# Patient Record
Sex: MALE | Race: WHITE | Employment: FULL TIME | URBAN - METROPOLITAN AREA
[De-identification: names, ages, dates, MRNs, and addresses within clinical notes are randomized per-mention and may not be internally consistent; named-entity substitution may affect disease eponyms.]

---

## 2020-01-24 ENCOUNTER — HOSPITAL ENCOUNTER (OUTPATIENT)
Dept: GENERAL RADIOLOGY | Age: 55
Discharge: HOME | End: 2020-01-24
Attending: INTERNAL MEDICINE
Payer: COMMERCIAL

## 2020-01-24 DIAGNOSIS — M54.30 SCIATICA: ICD-10-CM

## 2020-01-24 PROCEDURE — 72100 X-RAY EXAM L-S SPINE 2/3 VWS: CPT

## 2020-03-09 ENCOUNTER — HOSPITAL ENCOUNTER (OUTPATIENT)
Dept: GENERAL RADIOLOGY | Age: 55
Discharge: HOME | End: 2020-03-09
Attending: PHYSICAL MEDICINE & REHABILITATION
Payer: COMMERCIAL

## 2020-03-09 DIAGNOSIS — M48.061 STENOSIS, SPINAL, LUMBAR: ICD-10-CM

## 2020-03-09 DIAGNOSIS — M54.16 LUMBAR RADICULOPATHY: ICD-10-CM

## 2020-03-09 DIAGNOSIS — M54.17 LUMBOSACRAL NEURITIS: ICD-10-CM

## 2020-03-09 PROCEDURE — 72110 X-RAY EXAM L-2 SPINE 4/>VWS: CPT

## 2021-03-08 ENCOUNTER — HOSPITAL ENCOUNTER (OUTPATIENT)
Age: 56
Setting detail: OUTPATIENT SURGERY
Discharge: HOME | End: 2021-03-08
Attending: SPECIALIST | Admitting: SPECIALIST
Payer: COMMERCIAL

## 2021-03-08 VITALS
OXYGEN SATURATION: 99 % | TEMPERATURE: 97 F | HEIGHT: 72 IN | DIASTOLIC BLOOD PRESSURE: 89 MMHG | SYSTOLIC BLOOD PRESSURE: 147 MMHG | WEIGHT: 311.7 LBS | RESPIRATION RATE: 17 BRPM | HEART RATE: 71 BPM | BODY MASS INDEX: 42.22 KG/M2

## 2021-03-08 DIAGNOSIS — R94.39 ABNORMAL CARDIOVASCULAR STRESS TEST: ICD-10-CM

## 2021-03-08 LAB
ANION GAP SERPL CALC-SCNC: 6 MMOL/L (ref 5–15)
ATRIAL RATE: 74 BPM
BUN SERPL-MCNC: 6 MG/DL (ref 6–20)
BUN/CREAT SERPL: 8 (ref 12–20)
CALCIUM SERPL-MCNC: 8 MG/DL (ref 8.5–10.1)
CALCULATED P AXIS, ECG09: 43 DEGREES
CALCULATED R AXIS, ECG10: 16 DEGREES
CALCULATED T AXIS, ECG11: 41 DEGREES
CHLORIDE SERPL-SCNC: 105 MMOL/L (ref 97–108)
CO2 SERPL-SCNC: 26 MMOL/L (ref 21–32)
CREAT SERPL-MCNC: 0.71 MG/DL (ref 0.7–1.3)
DIAGNOSIS, 93000: NORMAL
ERYTHROCYTE [DISTWIDTH] IN BLOOD BY AUTOMATED COUNT: 12.3 % (ref 11.5–14.5)
GLUCOSE SERPL-MCNC: 175 MG/DL (ref 65–100)
HCT VFR BLD AUTO: 39.7 % (ref 36.6–50.3)
HGB BLD-MCNC: 13.8 G/DL (ref 12.1–17)
MCH RBC QN AUTO: 34 PG (ref 26–34)
MCHC RBC AUTO-ENTMCNC: 34.8 G/DL (ref 30–36.5)
MCV RBC AUTO: 97.8 FL (ref 80–99)
NRBC # BLD: 0 K/UL (ref 0–0.01)
NRBC BLD-RTO: 0 PER 100 WBC
P-R INTERVAL, ECG05: 188 MS
PLATELET # BLD AUTO: 159 K/UL (ref 150–400)
PMV BLD AUTO: 9.6 FL (ref 8.9–12.9)
POTASSIUM SERPL-SCNC: 4 MMOL/L (ref 3.5–5.1)
Q-T INTERVAL, ECG07: 396 MS
QRS DURATION, ECG06: 96 MS
QTC CALCULATION (BEZET), ECG08: 439 MS
RBC # BLD AUTO: 4.06 M/UL (ref 4.1–5.7)
SODIUM SERPL-SCNC: 137 MMOL/L (ref 136–145)
VENTRICULAR RATE, ECG03: 74 BPM
WBC # BLD AUTO: 3.3 K/UL (ref 4.1–11.1)

## 2021-03-08 PROCEDURE — 36415 COLL VENOUS BLD VENIPUNCTURE: CPT

## 2021-03-08 PROCEDURE — 74011250637 HC RX REV CODE- 250/637: Performed by: SPECIALIST

## 2021-03-08 PROCEDURE — 80048 BASIC METABOLIC PNL TOTAL CA: CPT

## 2021-03-08 PROCEDURE — 77030020269 HC MISC IMPL: Performed by: SPECIALIST

## 2021-03-08 PROCEDURE — 93571 IV DOP VEL&/PRESS C FLO 1ST: CPT | Performed by: SPECIALIST

## 2021-03-08 PROCEDURE — C1760 CLOSURE DEV, VASC: HCPCS | Performed by: SPECIALIST

## 2021-03-08 PROCEDURE — 77030004532 HC CATH ANGI DX IMP BSC -A: Performed by: SPECIALIST

## 2021-03-08 PROCEDURE — C1894 INTRO/SHEATH, NON-LASER: HCPCS | Performed by: SPECIALIST

## 2021-03-08 PROCEDURE — 99152 MOD SED SAME PHYS/QHP 5/>YRS: CPT | Performed by: SPECIALIST

## 2021-03-08 PROCEDURE — 74011000250 HC RX REV CODE- 250: Performed by: SPECIALIST

## 2021-03-08 PROCEDURE — 74011250636 HC RX REV CODE- 250/636: Performed by: SPECIALIST

## 2021-03-08 PROCEDURE — 74011000258 HC RX REV CODE- 258: Performed by: SPECIALIST

## 2021-03-08 PROCEDURE — C1887 CATHETER, GUIDING: HCPCS | Performed by: SPECIALIST

## 2021-03-08 PROCEDURE — 93005 ELECTROCARDIOGRAM TRACING: CPT

## 2021-03-08 PROCEDURE — 77030013715 HC INFL SYS MRTM -B: Performed by: SPECIALIST

## 2021-03-08 PROCEDURE — 77030003390 HC NDL ANGI MRTM -A: Performed by: SPECIALIST

## 2021-03-08 PROCEDURE — 85027 COMPLETE CBC AUTOMATED: CPT

## 2021-03-08 PROCEDURE — 93458 L HRT ARTERY/VENTRICLE ANGIO: CPT | Performed by: SPECIALIST

## 2021-03-08 PROCEDURE — 92928 PRQ TCAT PLMT NTRAC ST 1 LES: CPT | Performed by: SPECIALIST

## 2021-03-08 PROCEDURE — 77030008543 HC TBNG MON PRSS MRTM -A: Performed by: SPECIALIST

## 2021-03-08 PROCEDURE — C1725 CATH, TRANSLUMIN NON-LASER: HCPCS | Performed by: SPECIALIST

## 2021-03-08 PROCEDURE — 99153 MOD SED SAME PHYS/QHP EA: CPT | Performed by: SPECIALIST

## 2021-03-08 PROCEDURE — C1874 STENT, COATED/COV W/DEL SYS: HCPCS | Performed by: SPECIALIST

## 2021-03-08 PROCEDURE — 74011000636 HC RX REV CODE- 636: Performed by: SPECIALIST

## 2021-03-08 DEVICE — STENT RONYX25015UX RESOLUTE ONYX 2.50X15
Type: IMPLANTABLE DEVICE | Status: FUNCTIONAL
Brand: RESOLUTE ONYX™

## 2021-03-08 RX ORDER — HEPARIN SODIUM 1000 [USP'U]/ML
INJECTION, SOLUTION INTRAVENOUS; SUBCUTANEOUS AS NEEDED
Status: DISCONTINUED | OUTPATIENT
Start: 2021-03-08 | End: 2021-03-08 | Stop reason: HOSPADM

## 2021-03-08 RX ORDER — CLOPIDOGREL 300 MG/1
TABLET, FILM COATED ORAL AS NEEDED
Status: DISCONTINUED | OUTPATIENT
Start: 2021-03-08 | End: 2021-03-08 | Stop reason: HOSPADM

## 2021-03-08 RX ORDER — SODIUM CHLORIDE 9 MG/ML
75 INJECTION, SOLUTION INTRAVENOUS CONTINUOUS
Status: DISCONTINUED | OUTPATIENT
Start: 2021-03-08 | End: 2021-03-08

## 2021-03-08 RX ORDER — ASPIRIN 325 MG
325 TABLET ORAL ONCE
Status: COMPLETED | OUTPATIENT
Start: 2021-03-08 | End: 2021-03-08

## 2021-03-08 RX ORDER — NITROGLYCERIN 0.4 MG/1
0.4 TABLET SUBLINGUAL
Status: DISCONTINUED | OUTPATIENT
Start: 2021-03-08 | End: 2021-03-08 | Stop reason: HOSPADM

## 2021-03-08 RX ORDER — HYDROCORTISONE SODIUM SUCCINATE 100 MG/2ML
100 INJECTION, POWDER, FOR SOLUTION INTRAMUSCULAR; INTRAVENOUS
Status: DISCONTINUED | OUTPATIENT
Start: 2021-03-08 | End: 2021-03-08 | Stop reason: HOSPADM

## 2021-03-08 RX ORDER — CLOPIDOGREL BISULFATE 75 MG/1
75 TABLET ORAL DAILY
Qty: 30 TAB | Refills: 5 | Status: SHIPPED | OUTPATIENT
Start: 2021-03-08 | End: 2021-12-01

## 2021-03-08 RX ORDER — MIDAZOLAM HYDROCHLORIDE 1 MG/ML
INJECTION, SOLUTION INTRAMUSCULAR; INTRAVENOUS AS NEEDED
Status: DISCONTINUED | OUTPATIENT
Start: 2021-03-08 | End: 2021-03-08 | Stop reason: HOSPADM

## 2021-03-08 RX ORDER — IBUPROFEN 200 MG
200 TABLET ORAL
COMMUNITY
End: 2022-01-13

## 2021-03-08 RX ORDER — SODIUM CHLORIDE 0.9 % (FLUSH) 0.9 %
5-40 SYRINGE (ML) INJECTION EVERY 8 HOURS
Status: DISCONTINUED | OUTPATIENT
Start: 2021-03-08 | End: 2021-03-08 | Stop reason: HOSPADM

## 2021-03-08 RX ORDER — FENTANYL CITRATE 50 UG/ML
INJECTION, SOLUTION INTRAMUSCULAR; INTRAVENOUS AS NEEDED
Status: DISCONTINUED | OUTPATIENT
Start: 2021-03-08 | End: 2021-03-08 | Stop reason: HOSPADM

## 2021-03-08 RX ORDER — METOPROLOL SUCCINATE 100 MG/1
100 TABLET, EXTENDED RELEASE ORAL DAILY
COMMUNITY

## 2021-03-08 RX ORDER — GUAIFENESIN 100 MG/5ML
81 LIQUID (ML) ORAL DAILY
COMMUNITY

## 2021-03-08 RX ORDER — LIDOCAINE HYDROCHLORIDE 10 MG/ML
INJECTION INFILTRATION; PERINEURAL AS NEEDED
Status: DISCONTINUED | OUTPATIENT
Start: 2021-03-08 | End: 2021-03-08 | Stop reason: HOSPADM

## 2021-03-08 RX ORDER — ATORVASTATIN CALCIUM 20 MG/1
20 TABLET, FILM COATED ORAL DAILY
Qty: 30 TAB | Refills: 5 | Status: SHIPPED | OUTPATIENT
Start: 2021-03-08

## 2021-03-08 RX ORDER — OLMESARTAN MEDOXOMIL AND HYDROCHLOROTHIAZIDE 40/25 40; 25 MG/1; MG/1
1 TABLET ORAL DAILY
COMMUNITY
End: 2021-12-01

## 2021-03-08 RX ORDER — DIPHENHYDRAMINE HYDROCHLORIDE 50 MG/ML
25 INJECTION, SOLUTION INTRAMUSCULAR; INTRAVENOUS ONCE
Status: COMPLETED | OUTPATIENT
Start: 2021-03-08 | End: 2021-03-08

## 2021-03-08 RX ORDER — SODIUM CHLORIDE 0.9 % (FLUSH) 0.9 %
5-40 SYRINGE (ML) INJECTION AS NEEDED
Status: DISCONTINUED | OUTPATIENT
Start: 2021-03-08 | End: 2021-03-08 | Stop reason: HOSPADM

## 2021-03-08 RX ORDER — SODIUM CHLORIDE 9 MG/ML
125 INJECTION, SOLUTION INTRAVENOUS CONTINUOUS
Status: DISPENSED | OUTPATIENT
Start: 2021-03-08 | End: 2021-03-08

## 2021-03-08 RX ORDER — HEPARIN SODIUM 200 [USP'U]/100ML
INJECTION, SOLUTION INTRAVENOUS
Status: COMPLETED | OUTPATIENT
Start: 2021-03-08 | End: 2021-03-08

## 2021-03-08 RX ADMIN — SODIUM CHLORIDE 75 ML/HR: 9 INJECTION, SOLUTION INTRAVENOUS at 08:33

## 2021-03-08 RX ADMIN — ASPIRIN 325 MG: 325 TABLET ORAL at 08:33

## 2021-03-08 RX ADMIN — DIPHENHYDRAMINE HYDROCHLORIDE 25 MG: 50 INJECTION, SOLUTION INTRAMUSCULAR; INTRAVENOUS at 08:35

## 2021-03-08 NOTE — PROGRESS NOTES
[address] [facility] March 8, 2021       RE: Elayne Guzmán      To Whom It May Concern,    This is to certify that Elayne Guzmán may return to work on light duty starting 3/10/2021. He is not to lift greater than 10 pounds until after 3/13/2021. Please feel free to contact Dr. Alondra Ling office if you have any questions or concerns. Thank you for your assistance in this matter.       Sincerely,  Abby Sanchez RN

## 2021-03-08 NOTE — DISCHARGE INSTRUCTIONS
Discharge Instructions:                    Cardiac Catheterization/Angiography Discharge Instructions    *Check the puncture site frequently for swelling or bleeding. If you see any bleeding, lie down and apply pressure over the area and call 911. Notify your doctor for any redness, swelling, drainage or oozing from the puncture site. Notify your doctor for any fever or chills. *If the leg or arm with the puncture becomes cold, numb or painful, call Dr Alyse Mora at  [phone]    *Activity should be limited for the next 48 hours. Climb stairs as little as possible and avoid any stooping, bending or strenuous activity for 48 hours. No heavy lifting (anything over 10 pounds) for five days. *Do not drive for 24 hours. *You may resume your usual diet. Drink more fluids than usual.    *Have a responsible person drive you home and stay with you for at least 24 hours after your heart catheterization/angiography. *You may remove the bandage from your groin in 24 hours. You may shower in 24 hours. No tub baths, hot tubs or swimming for one week. Do not place any lotions, creams, powders, ointments over the puncture site for one week. You may place a clean band-aid over the puncture site each day for 5 days. Change this daily. Medications: Take medications as prescribed. Activity:     As tolerated except do not lift over 10 pounds for 5 days. Diet:     American Heart Association. Follow-up:     Follow up with Saima Culp MD on March 22nd, 2021 at [place] [address].  Delmy Schumacher [address]  [phone]      If you smoke, STOP!

## 2021-03-08 NOTE — H&P
Date of Surgery Update:  Miri Lopez was seen and examined. History and physical has been reviewed. The patient has been examined. There have been no significant clinical changes since the completion of the originally dated History and Physical.    Signed By: Micky Gaitan MD     March 8, 2021 8:15 AM       CP, + EBT    Felipe Shelby  1965   Office/Outpatient Visit  Visit Date: Fri, Feb 26, 2021 04:20 pm  Provider: Nadira Arauz MD   Location: [facility])[address] [address] [phone]    Electronically signed by Brittany Hernandez MD on  02/26/2021 04:44:57 PM                           Subjective:    CC: Mr. Jud Oliver is a 63year old male. His primary care physician is Tomasz Diaz MD.  He is here to follow up with the doctor regarding previous testing, stress echocardiogram - 2/23/21. He presents today with a complaint of shortness of breath. The primary reason for today's visit is evaluation of the following: abnormal EKG. He has a history of cardiomyopathy ( unspecified ) and hypertension. HPI:         MD Notes: Sinus, QS V1 V2 Abnormal electrocardiogram [ECG] [EKG] noted. Regarding hypertension:  MD Notes: Family history of CABG father in his 66's, mother in her 52's  Type Primary Hypertension Current symptoms include shortness of breath and lower extremity edema. He denies chest pain. Currently, his treatment regimen consists of Toprol-XL, a diuretic ( hydrochlorothiazide ), and Olmesartan. Regarding dyspnea/shortness of breath: This has been noted for the past more than 5 years. Associated symptoms include peripheral edema. He denies chest tightness. This tends to be worse with exertion. The shortness of breath is better rest.            Regarding cardiomyopathy: Currently, his treatment regimen consists of toprol xl. Stress echo 2/23/21 EF 45%           Coronary Artery Disease:  MD Notes:  Will hold off on statin due to elevated LFT. The patient has had prior EBT calcium score 1690 (extensive coronary calcification). .          MD Notes: Stress echo: EF 45%, apical, inferior, posterior hypokinesis. Abnormal result of other cardiovascular function study noted. Past Medical History / Family History / Social History:     Last Reviewed on 2/25/2021 04:35 PM by Jewel Mcnamara  Past Medical History:     Hypertension Pre-Diabetic   INFLUENZA VACCINE: was last done 2020     Surgical History:   Surgical/Procedural History: Tonsillectomy  Rotator cuff surgery   Joint Replacement:  Shoulder Replacement: Right;   Neck Fusion  Left Wrist     Cardiac Surgery/Procedures:  Neck Surgery  Seed implantation for prostate cancer  Excision of basal cell on the neck     Family History:   Father: Skin Cancer;  cerebrovascular accident   Mother: coronary artery disease; hypertension;  type 2 diabetes     Social History:   Social History:   Occupation: [employer] ;   Marital Status:    Children: 3 children     Tobacco/Alcohol/Supplements:   Last Reviewed on 2/25/2021 04:35 PM by Trang Poe  TOBACCO/ALCOHOL/SUPPLEMENTS   Tobacco: He has a past history of cigarette smoking; quit 2 years. Alcohol: Drinks alcohol on a regular basis. Caffeine:  He admits to consuming caffeine via none. Substance Abuse History:   Last Reviewed on 2/25/2021 04:35 PM by Trang Rahman  Substance Use/Abuse:   None     Mental Health History:   Last Reviewed on 2/25/2021 04:35 PM by Trang Rahman    Communicable Diseases (eg STDs):    Last Reviewed on 2/25/2021 04:35 PM by Jewel Mcnamara    Current Problems:   Last Reviewed on 2/25/2021 04:35 PM by Trang Rahman  Essential (primary) hypertension  Shortness of breath  Abnormal electrocardiogram [ECG] [EKG]  Localized edema  Essential hypertension, benign  Shortness of Breath  Abnormal EKG    Allergies:   Last Reviewed on 2/25/2021 04:35 PM by Trang Rahman  shellfish derived:      Current Medications: Last Reviewed on 2/25/2021 04:35 PM by Trang Rahman  metoprolol succinate 100 mg oral Tablet, Extended Release 24 hr [take 1 tablet (100 mg) by oral route once daily]  olmesartan-hydrochlorothiazide 40-25 mg oral tablet [take 1 tablet by oral route once daily]  ibuprofen 200 mg oral tablet [take 1 tablet (200 mg) by oral route every 4 hours as needed with food]    Objective:    Vitals:     Historical:   2/2/2021  BP:   144/86 mm Hg ( (left arm, , standing, );) 2/2/2021  BP:   131/88 mm Hg ( (left arm, , sitting, );) 2/2/2021  Wt:   326lbs  Exams:   GENERAL:  Alert, oriented to person, place and time. HEENT:  Pinkish palpebral  conjunctivae. Anicteric sclerae. NECK:  No jugular vein engorgement. No bruit. CHEST: Equal expansion. Clear breath sounds. No rales, no wheezing. Heart: Reg rate and rhythm. Grade 2/6 systolic ejection murmur at the left sternal border area. ABDOMEN:  Soft. Normal active bowel sounds. No tenderness. Extremities: 1+ pitting pedal edema. NEURO:  Grossly intact. Assessment:     R94.31   Abnormal electrocardiogram [ECG] [EKG]     I10   Essential (primary) hypertension     R06.02   Shortness of breath     I42.8   Other cardiomyopathies     I25.118   Atherosclerotic heart disease of native coronary artery with other forms of angina pectoris     R94.39   Abnormal result of other cardiovascular function study       ORDERS:     Procedures Ordered:     27112  Education and train for pt self-mgmt by qualified, nonphysician, karina 30 minutes; individual pt  (Send-Out)          XCATH  Cardiac Cath  (In-House)          Other Orders:     BS588T  Queried Patient for Tobacco Use  (Send-Out)              Plan:     Shortness of breath  1. Medication list has been reviewed. Discontinue ibuprofen. Start the following medication(s):  aspirin 81 mg daily with food. .      Smoking Status:  Nonsmoker   2. Advised the patient regarding diet, exercise, and lifestyle modification.     3. The patient to call the office if there is any change in his cardiac symptoms. 4.  Explained to the patient the importance of controlling his cardiac risk factors. Testing/Procedures: Cardiac Catheterization  Explained to the patient the indication, procedure, risks, and benefits of cardiac catheterization. The patient understands  and wishes to proceed with the cath to be performed as an outpatient at [facility] by Dr. Monica Reyes. Schedule a follow up appointment in 2 weeks. The above note was transcribed by Jorge A Escobar and authenticated by Dr. Stephani Carson prior to sign off.

## 2021-03-08 NOTE — CARDIO/PULMONARY
[facility] Cardiopulmonary Rehab: 62 yo male admitted for cardiac cath. S/P PTCA with BONI to LAD. LVEF 55%. Cardiologist is Dr Micah Edge. Met with Martha Swift prior to discharge from post-cath recovery unit. Pt reported he resides with his wife & 17 yo son in [place]. He also has a 28 yo son in [facility], and 26 yo daughter in [facility]. Pt works in [place] as a  at [employer]. PCI education folder with stent card at bedside. Pt indicated awareness that he needed a stent. Reviewed CAD risk factors: HTN, obesity, smoking hx (quit 2019), family hx, age, and gender. Pt started smoking at age 24 and smoked up to 1 ppd. He stopped smoking 2 years ago, and used electronic cigarette to help wean himself off nicotine during 2019. Pt expressed awareness that he needs to follow a heart healthy diet and lose weight. Discussed the benefits of outpatient cardiac rehab program. Pt expressed an interest in cardiac rehab; however, his work schedule would make it difficult to schedule. Pt works Mon-Fri 8a-4p in [place]. Explained that there is a cardiac rehab program in [facility], which would likely be more convenient. Pt reported his work is physically demanding and he walks a great deal. He reported that all his toes on both feet are numb. Pt has hx of cervical spine surgery, as well as other orthopedic issues. Encouraged pt to talk with his PCP about toe numbness as this may negatively impact his balance and result in a fall. Also encouraged pt to discuss cardiac rehab with Dr Micah Edge during his appt on 3/22/21. Pt verbalized understanding and all questions were answered.

## 2021-03-08 NOTE — PROGRESS NOTES
TRANSFER - IN REPORT:    Verbal report received from [facility] RN(name) on Gilbert Evangelina  being received from cath lab(unit) for routine progression of care      Report consisted of patients Situation, Background, Assessment and   Recommendations(SBAR). Information from the following report(s) Procedure Summary was reviewed with the receiving nurse. Opportunity for questions and clarification was provided. Assessment completed upon patients arrival to unit and care assumed. 1205: Patient received from cath lab. Patient with angioseal to right groin site. Clean, dry and intact. No hematoma. Pulses palpable. VS stable. Patient with no complaints at this time. 1210: EKG complete    1235: Discharge instructions and prescriptions reviewed with patient wife. Opportunity provided for questions. Wife verbalized understanding. 1400: Patient HOB elevated 30 degrees. Patient with right groin site clean, dry and intact. No hematoma. Pulses palpable. VS stable. Patient with no complaints at this time. Assisted with lunch tray. 1415: Cardiac rehab at the bedside. 1445:  Patient HOB elevated 60 degrees. Patient with right groin site clean, dry and intact. No hematoma. Pulses palpable. VS stable. Patient with no complaints at this time. 1450: Discharge instructions and prescriptions reviewed with patient. Opportunity provided for questions. Patient verbalized understanding. Signed copy of discharge placed in the front of patient's chart. 1539: Patient dangled on the side of the bed. Patient with right groin site clean, dry and intact. No hematoma. Pulses palpable. VS stable. Patient with no complaints at this time. 1543: Patient ambulated to the bathroom. Voided. Patient with steady gait. Patient with right groin site clean, dry and intact. No hematoma. Pulses palpable. VS stable. Patient with no complaints at this time. 1552: IV and tele removed.     1605: Patient escorted via wheelchair to entrance. Patient wife driving. Patient discharged into care of wife.

## 2021-03-08 NOTE — PROCEDURES
Cath:  Obstructive 1VD:     LAD m70 (iFR 0.85, significant). LCx p20; OM2 20     RCA p30, d30  Normal LVF (EF 55%). No AVG/MR  Successful BONI mLAD:     2.5x15 Nahid     Post-dil with 2.75x12 NC Euphora  RFA angioseal.    ASA/plavix, statin.   F/U with Dr. Sarah Stiles 3/22/21 @ 2pm.

## 2021-03-08 NOTE — PROGRESS NOTES
TRANSFER - OUT REPORT:    Verbal report given to [facility] EMERGENCY SERVICE RN(name) on Oscar Ricketts  being transferred to cath lab(unit) for routine progression of care       Report consisted of patients Situation, Background, Assessment and   Recommendations(SBAR). Information from the following report(s) SBAR was reviewed with the receiving nurse. Lines:   Peripheral IV 03/08/21 Right Forearm (Active)   Site Assessment Clean, dry, & intact 03/08/21 0832   Phlebitis Assessment 0 03/08/21 0832   Infiltration Assessment 0 03/08/21 0832   Dressing Status Clean, dry, & intact 03/08/21 0832   Dressing Type Transparent 03/08/21 0832   Hub Color/Line Status Blue;Flushed; Infusing 03/08/21 0350        Opportunity for questions and clarification was provided.       Patient transported with:   Registered Nurse

## 2021-03-08 NOTE — Clinical Note
Lesion located in the Mid LAD. Balloon inflated using single inflation technique. Lesion #1: Pressure = 20 talha; Duration = 30 sec.

## 2021-03-08 NOTE — Clinical Note
Vitals documented in Cath Lab sent to media tab in patient chart prior to moving from Cath to Angio. Vitals from Angio Lab to be sent separately.

## 2021-03-08 NOTE — Clinical Note
Xray not functioning properly even after reboot. Patient to be moved from Cath Lab to Angio Lab. Sheath intact.

## 2021-03-08 NOTE — Clinical Note
Lesion: Located in the Mid LAD. Stent deployed. First inflation pressure = 16 talha; inflation time: 40 sec.

## 2021-12-01 ENCOUNTER — OFFICE VISIT (OUTPATIENT)
Dept: ORTHOPEDIC SURGERY | Age: 56
End: 2021-12-01
Payer: COMMERCIAL

## 2021-12-01 VITALS — BODY MASS INDEX: 38.43 KG/M2 | WEIGHT: 290 LBS | HEIGHT: 73 IN

## 2021-12-01 DIAGNOSIS — M17.12 OSTEOARTHRITIS OF LEFT KNEE, UNSPECIFIED OSTEOARTHRITIS TYPE: Primary | ICD-10-CM

## 2021-12-01 PROCEDURE — 20610 DRAIN/INJ JOINT/BURSA W/O US: CPT | Performed by: ORTHOPAEDIC SURGERY

## 2021-12-01 PROCEDURE — 99214 OFFICE O/P EST MOD 30 MIN: CPT | Performed by: ORTHOPAEDIC SURGERY

## 2021-12-01 RX ORDER — LIDOCAINE HYDROCHLORIDE 10 MG/ML
1 INJECTION INFILTRATION; PERINEURAL ONCE
Status: COMPLETED | OUTPATIENT
Start: 2021-12-01 | End: 2021-12-01

## 2021-12-01 RX ORDER — DULAGLUTIDE 0.75 MG/.5ML
0.75 INJECTION, SOLUTION SUBCUTANEOUS
COMMUNITY
Start: 2021-11-17

## 2021-12-01 RX ORDER — ERYTHROMYCIN 5 MG/G
OINTMENT OPHTHALMIC
COMMUNITY
Start: 2021-11-29 | End: 2022-01-13

## 2021-12-01 RX ORDER — ENALAPRIL MALEATE AND HYDROCHLOROTHIAZIDE 10; 25 MG/1; MG/1
TABLET ORAL
COMMUNITY
End: 2022-01-13

## 2021-12-01 RX ORDER — DICLOFENAC SODIUM 50 MG/1
50 TABLET, DELAYED RELEASE ORAL
COMMUNITY
Start: 2021-11-12 | End: 2022-02-28

## 2021-12-01 RX ORDER — METFORMIN HYDROCHLORIDE 500 MG/1
500 TABLET ORAL 2 TIMES DAILY WITH MEALS
COMMUNITY
Start: 2021-11-14

## 2021-12-01 RX ORDER — CLONIDINE HYDROCHLORIDE 0.1 MG/1
TABLET ORAL
COMMUNITY
Start: 2021-10-04 | End: 2022-01-13

## 2021-12-01 RX ORDER — CYCLOBENZAPRINE HCL 10 MG
10 TABLET ORAL
Qty: 30 TABLET | Refills: 0 | Status: SHIPPED | OUTPATIENT
Start: 2021-12-01 | End: 2022-02-28

## 2021-12-01 RX ORDER — METHYLPREDNISOLONE ACETATE 40 MG/ML
80 INJECTION, SUSPENSION INTRA-ARTICULAR; INTRALESIONAL; INTRAMUSCULAR; SOFT TISSUE ONCE
Status: COMPLETED | OUTPATIENT
Start: 2021-12-01 | End: 2021-12-01

## 2021-12-01 RX ORDER — BUPIVACAINE HYDROCHLORIDE 2.5 MG/ML
1 INJECTION, SOLUTION INFILTRATION; PERINEURAL ONCE
Status: COMPLETED | OUTPATIENT
Start: 2021-12-01 | End: 2021-12-01

## 2021-12-01 RX ADMIN — BUPIVACAINE HYDROCHLORIDE 2.5 MG: 2.5 INJECTION, SOLUTION INFILTRATION; PERINEURAL at 15:33

## 2021-12-01 RX ADMIN — METHYLPREDNISOLONE ACETATE 80 MG: 40 INJECTION, SUSPENSION INTRA-ARTICULAR; INTRALESIONAL; INTRAMUSCULAR; SOFT TISSUE at 15:35

## 2021-12-01 RX ADMIN — LIDOCAINE HYDROCHLORIDE 1 ML: 10 INJECTION INFILTRATION; PERINEURAL at 15:34

## 2021-12-01 NOTE — PROGRESS NOTES
SUBJECTIVE/OBJECTIVE:  Reza Verduzco (: 1965) is a 61 y.o. male, patient,here for evaluation of the left knee. He was initially evaluated in October of this year. He has been trying some anti-inflammatory medications for his knee. He has known arthritic changes from his x-rays. He presents today for an injection. Physical Exam    Upon physical examination, the patient is well developed, well nourished, alert and oriented times three, with normal mood and affect and walks with an antalgic gait. Upon examination of the left knee, the patient is tender to palpation along the medial joint line, and has an effusion. They have crepitus of the patellofemoral joint with range of motion and discomfort with patella grind testing. The patient has discomfort with Tito´s maneuvers, and the knee is stable. They lack full flexion secondary to the effusion, but have full extension. They have 5/5 strength, and are neurovascularly intact distally. There is no erythema, warmth or skin lesions present. On examination of the contralateral extremity, the patient is nontender to palpation and has excellent range of motion, stability and strength. Imaging:    I have reviewed the patient´s previous diagnostic tests in an effort to support the diagnosis and treatment options. ASSESSMENT/PLAN:  Below is the assessment and plan developed based on review of pertinent history, physical exam, labs, studies, and medications. 1. Osteoarthritis of left knee, unspecified osteoarthritis type  -     IL DRAIN/INJECT LARGE JOINT/BURSA  -     REFERRAL TO ORTHOPEDICS      Return for Referral to knee replacement specilaist.      In discussion with the patient, we considered the numerus possible diagnoses that could be contributing to their present symptoms. We also deliberated on the extensive management options that must be considered to treat their current condition.  We reviewed their accessible prior medical records, diagnostic tests, and current health and employment information. We considered how these symptoms were affecting the patient´s activities of daily living as well as employment and fitness activities. The patient had various questions regarding the possible risks, benefits, complications, morbidity and mortality regarding their diagnosis and treatment options. The patients´ comorbidities were considered, and I advocated that they consider maximizing lifestyle modification through nutrition and exercise to aid in addressing their symptoms. Shared decision making yielded an understanding to move forward with conservation treatment preferences. The patient expressed understanding that if conservative management fails to alleviate the present symptoms they will return to office for re-evaluation and consideration of additional diagnostic tests and potential surgical options. In the interim, we have recommned ice, elevation and anti-inflammatory medications along with a physician directed home exercise program. We discussed the risks and common side effects of anti-inflamatory medications and instructed the patient to discontinue the medication and contact us if they experienced any side effects. The patient was encouraged to discuss the possible side effects with their family physician or pharmacist prior to initiating any new medications. I have encouraged the patient to take an active role in their treatment by pursuing a healthy lifestyle with better nutritional choices and regular low impact exercise. We discussed that weight loss can be beneficial to relieving discomfort in the lower extremities. We discussed the possibility of an injection of a steroid mixed with a local anesthetic to relieve pain and decrease inflammation in the left knee.  The risks of a steroid injection which include but are not limited to cartilage damage, death of nearby bone, joint infection, nerve damage, temporary facial flushing, temporary steroid flare of pain and inflammation in the joint, temporary increase in blood sugar, tendon weakening or rupture, thinning of nearby bone (osteoporosis), thinning of skin and soft tissue around the injection site, and whitening or lightening of the skin around the injection site were reviewed at length. We specifically advised that patients with diabetes talk to their primary care to ensure they are safe for a steroid injection due to the transient increase in blood sugar associated with the injection. We discussed the chance of increased bleeding and bruising if the patient is on blood thinners or certain dietary supplements that have a blood-thinning effect. We advised patients that have an active infection, history of allergic reactions to steroids or take medications that may prohibit them from receiving a steroid injection to talk to their primary care physician before receiving and injection. We also talked about limiting the number of injections because these potential side effects increase with larger doses and repeated use. After confirming the proposed area for injection with the patient, the skin was prepped with alcohol to reduce the chances of infection. The skin was anesthetized with topical ethylene chloride spray and the mixture of steroid and local anesthetic was injected slowly into the left knee in a sterile fashion without difficulty. The needle was removed and disposed of in a sterile container. The patient tolerated the injection well and a band-aid was placed on the skin. The patient was counseled on protecting the injection area, avoiding water submersion for 2 days, icing for pain relief and looking for signs and symptoms of infection. We requested that the patient contact us if any symptoms persist greater than 48 hours after the injection.          After a long discussion regarding treatment options, we have elected to refer the patient to one of our knee replacement specialist for more comprehensive care of their arthritis. Allergies   Allergen Reactions    Shellfish Derived Nausea Only       Current Outpatient Medications   Medication Sig    cloNIDine HCL (CATAPRES) 0.1 mg tablet     diclofenac EC (VOLTAREN) 50 mg EC tablet     Trulicity 0.93 DW/1.7 mL sub-q pen     enalapril-hydroCHLOROthiazide (VASERETIC) 10-25 mg tablet enalapril 10 mg-hydrochlorothiazide 25 mg tablet   Take 1 tablet every day by oral route.  metFORMIN (GLUCOPHAGE) 500 mg tablet     erythromycin (ILOTYCIN) ophthalmic ointment     metoprolol succinate (TOPROL-XL) 100 mg tablet Take 100 mg by mouth daily.  ibuprofen (MOTRIN) 200 mg tablet Take 200 mg by mouth every six (6) hours as needed for Pain.  aspirin 81 mg chewable tablet Take 81 mg by mouth daily.  atorvastatin (Lipitor) 20 mg tablet Take 1 Tab by mouth daily. Indications: treatment to slow progression of coronary artery disease     Current Facility-Administered Medications   Medication    bupivacaine HCl (MARCAINE) 0.25 % (2.5 mg/mL) injection 2.5 mg    lidocaine (XYLOCAINE) 10 mg/mL (1 %) injection 1 mL    methylPREDNISolone acetate (DEPO-MEDROL) 40 mg/mL injection 80 mg       Past Medical History:   Diagnosis Date    Cancer ([facility]     Diabetes ([facility]     Hypertension        No past surgical history on file. No family history on file. Social History     Socioeconomic History    Marital status:      Spouse name: Not on file    Number of children: Not on file    Years of education: Not on file    Highest education level: Not on file   Occupational History    Not on file   Tobacco Use    Smoking status: Former Smoker    Smokeless tobacco: Never Used   Vaping Use    Vaping Use: Never used   Substance and Sexual Activity    Alcohol use:  Yes    Drug use: Never    Sexual activity: Not on file   Other Topics Concern    Not on file   Social History Narrative    Not on file     Social Determinants of Health     Financial Resource Strain:     Difficulty of Paying Living Expenses: Not on file   Food Insecurity:     Worried About Running Out of Food in the Last Year: Not on file    Branden of Food in the Last Year: Not on file   Transportation Needs:     Lack of Transportation (Medical): Not on file    Lack of Transportation (Non-Medical): Not on file   Physical Activity:     Days of Exercise per Week: Not on file    Minutes of Exercise per Session: Not on file   Stress:     Feeling of Stress : Not on file   Social Connections:     Frequency of Communication with Friends and Family: Not on file    Frequency of Social Gatherings with Friends and Family: Not on file    Attends Anabaptist Services: Not on file    Active Member of [address] [place] [address] [employer] or Organizations: Not on file    Attends Club or Organization Meetings: Not on file    Marital Status: Not on file   Intimate Partner Violence:     Fear of Current or Ex-Partner: Not on file    Emotionally Abused: Not on file    Physically Abused: Not on file    Sexually Abused: Not on file   Housing Stability:     Unable to Pay for Housing in the Last Year: Not on file    Number of [place] in the Last Year: Not on file    Unstable Housing in the Last Year: Not on file       Review of Systems    No flowsheet data found. Vitals:  Ht 6' 1\" (1.854 m)   Wt 290 lb (131.5 kg)   BMI 38.26 kg/m²    Body mass index is 38.26 kg/m². An electronic signature was used to authenticate this note.   -- Jooã Soni MD

## 2022-01-13 ENCOUNTER — HOSPITAL ENCOUNTER (INPATIENT)
Age: 57
LOS: 3 days | Discharge: HOME | DRG: 177 | End: 2022-01-16
Attending: EMERGENCY MEDICINE | Admitting: INTERNAL MEDICINE
Payer: COMMERCIAL

## 2022-01-13 ENCOUNTER — APPOINTMENT (OUTPATIENT)
Dept: GENERAL RADIOLOGY | Age: 57
DRG: 177 | End: 2022-01-13
Attending: EMERGENCY MEDICINE
Payer: COMMERCIAL

## 2022-01-13 DIAGNOSIS — J12.82 PNEUMONIA DUE TO COVID-19 VIRUS: Primary | ICD-10-CM

## 2022-01-13 DIAGNOSIS — U07.1 PNEUMONIA DUE TO COVID-19 VIRUS: Primary | ICD-10-CM

## 2022-01-13 DIAGNOSIS — J96.01 ACUTE RESPIRATORY FAILURE WITH HYPOXIA (HCC): ICD-10-CM

## 2022-01-13 PROBLEM — E87.1 HYPONATREMIA: Status: ACTIVE | Noted: 2022-01-13

## 2022-01-13 PROBLEM — E11.9 DM (DIABETES MELLITUS) (HCC): Status: ACTIVE | Noted: 2022-01-13

## 2022-01-13 PROBLEM — I10 HTN (HYPERTENSION): Status: ACTIVE | Noted: 2022-01-13

## 2022-01-13 LAB
ALBUMIN SERPL-MCNC: 2.5 G/DL (ref 3.5–5)
ALBUMIN/GLOB SERPL: 0.5 {RATIO} (ref 1.1–2.2)
ALP SERPL-CCNC: 92 U/L (ref 45–117)
ALT SERPL-CCNC: 76 U/L (ref 12–78)
ANION GAP SERPL CALC-SCNC: 5 MMOL/L (ref 5–15)
AST SERPL-CCNC: 102 U/L (ref 15–37)
BASOPHILS # BLD: 0 K/UL (ref 0–0.1)
BASOPHILS NFR BLD: 0 % (ref 0–1)
BILIRUB SERPL-MCNC: 1.6 MG/DL (ref 0.2–1)
BUN SERPL-MCNC: 8 MG/DL (ref 6–20)
BUN/CREAT SERPL: 10 (ref 12–20)
CALCIUM SERPL-MCNC: 8.9 MG/DL (ref 8.5–10.1)
CHLORIDE SERPL-SCNC: 99 MMOL/L (ref 97–108)
CO2 SERPL-SCNC: 27 MMOL/L (ref 21–32)
COMMENT, HOLDF: NORMAL
COVID-19 RAPID TEST, COVR: DETECTED
CREAT SERPL-MCNC: 0.8 MG/DL (ref 0.7–1.3)
CRP SERPL-MCNC: 7.51 MG/DL (ref 0–0.6)
DIFFERENTIAL METHOD BLD: ABNORMAL
EOSINOPHIL # BLD: 0 K/UL (ref 0–0.4)
EOSINOPHIL NFR BLD: 0 % (ref 0–7)
ERYTHROCYTE [DISTWIDTH] IN BLOOD BY AUTOMATED COUNT: 12.5 % (ref 11.5–14.5)
GLOBULIN SER CALC-MCNC: 5 G/DL (ref 2–4)
GLUCOSE BLD STRIP.AUTO-MCNC: 144 MG/DL (ref 65–117)
GLUCOSE SERPL-MCNC: 142 MG/DL (ref 65–100)
HCT VFR BLD AUTO: 38.7 % (ref 36.6–50.3)
HGB BLD-MCNC: 13.5 G/DL (ref 12.1–17)
IMM GRANULOCYTES # BLD AUTO: 0.1 K/UL (ref 0–0.04)
IMM GRANULOCYTES NFR BLD AUTO: 1 % (ref 0–0.5)
LYMPHOCYTES # BLD: 0.5 K/UL (ref 0.8–3.5)
LYMPHOCYTES NFR BLD: 9 % (ref 12–49)
MCH RBC QN AUTO: 34.4 PG (ref 26–34)
MCHC RBC AUTO-ENTMCNC: 34.9 G/DL (ref 30–36.5)
MCV RBC AUTO: 98.7 FL (ref 80–99)
MONOCYTES # BLD: 0.8 K/UL (ref 0–1)
MONOCYTES NFR BLD: 13 % (ref 5–13)
NEUTS SEG # BLD: 4.6 K/UL (ref 1.8–8)
NEUTS SEG NFR BLD: 77 % (ref 32–75)
NRBC # BLD: 0 K/UL (ref 0–0.01)
NRBC BLD-RTO: 0 PER 100 WBC
PLATELET # BLD AUTO: 217 K/UL (ref 150–400)
PMV BLD AUTO: 9.5 FL (ref 8.9–12.9)
POTASSIUM SERPL-SCNC: 4 MMOL/L (ref 3.5–5.1)
PROCALCITONIN SERPL-MCNC: 0.17 NG/ML
PROT SERPL-MCNC: 7.5 G/DL (ref 6.4–8.2)
RBC # BLD AUTO: 3.92 M/UL (ref 4.1–5.7)
RBC MORPH BLD: ABNORMAL
SAMPLES BEING HELD,HOLD: NORMAL
SERVICE CMNT-IMP: ABNORMAL
SODIUM SERPL-SCNC: 131 MMOL/L (ref 136–145)
SOURCE, COVRS: ABNORMAL
TROPONIN-HIGH SENSITIVITY: 7 NG/L (ref 0–76)
WBC # BLD AUTO: 6 K/UL (ref 4.1–11.1)

## 2022-01-13 PROCEDURE — 80053 COMPREHEN METABOLIC PANEL: CPT

## 2022-01-13 PROCEDURE — 65270000029 HC RM PRIVATE

## 2022-01-13 PROCEDURE — 85027 COMPLETE CBC AUTOMATED: CPT

## 2022-01-13 PROCEDURE — 74011000250 HC RX REV CODE- 250: Performed by: INTERNAL MEDICINE

## 2022-01-13 PROCEDURE — XW0DXM6 INTRODUCTION OF BARICITINIB INTO MOUTH AND PHARYNX, EXTERNAL APPROACH, NEW TECHNOLOGY GROUP 6: ICD-10-PCS | Performed by: INTERNAL MEDICINE

## 2022-01-13 PROCEDURE — 85025 COMPLETE CBC W/AUTO DIFF WBC: CPT

## 2022-01-13 PROCEDURE — 74011250636 HC RX REV CODE- 250/636: Performed by: EMERGENCY MEDICINE

## 2022-01-13 PROCEDURE — 74011250636 HC RX REV CODE- 250/636: Performed by: INTERNAL MEDICINE

## 2022-01-13 PROCEDURE — 77010033678 HC OXYGEN DAILY

## 2022-01-13 PROCEDURE — 80048 BASIC METABOLIC PNL TOTAL CA: CPT

## 2022-01-13 PROCEDURE — 94760 N-INVAS EAR/PLS OXIMETRY 1: CPT

## 2022-01-13 PROCEDURE — 84145 PROCALCITONIN (PCT): CPT

## 2022-01-13 PROCEDURE — 82962 GLUCOSE BLOOD TEST: CPT

## 2022-01-13 PROCEDURE — 71045 X-RAY EXAM CHEST 1 VIEW: CPT

## 2022-01-13 PROCEDURE — 86140 C-REACTIVE PROTEIN: CPT

## 2022-01-13 PROCEDURE — 94761 N-INVAS EAR/PLS OXIMETRY MLT: CPT

## 2022-01-13 PROCEDURE — 36415 COLL VENOUS BLD VENIPUNCTURE: CPT

## 2022-01-13 PROCEDURE — 74011250637 HC RX REV CODE- 250/637: Performed by: INTERNAL MEDICINE

## 2022-01-13 PROCEDURE — 99285 EMERGENCY DEPT VISIT HI MDM: CPT

## 2022-01-13 PROCEDURE — 87635 SARS-COV-2 COVID-19 AMP PRB: CPT

## 2022-01-13 PROCEDURE — 93005 ELECTROCARDIOGRAM TRACING: CPT

## 2022-01-13 PROCEDURE — 74011636637 HC RX REV CODE- 636/637: Performed by: INTERNAL MEDICINE

## 2022-01-13 PROCEDURE — 84484 ASSAY OF TROPONIN QUANT: CPT

## 2022-01-13 RX ORDER — CLOPIDOGREL BISULFATE 75 MG/1
75 TABLET ORAL DAILY
COMMUNITY
End: 2022-01-13

## 2022-01-13 RX ORDER — CLOPIDOGREL BISULFATE 75 MG/1
75 TABLET ORAL DAILY
COMMUNITY
End: 2022-02-28

## 2022-01-13 RX ORDER — DEXAMETHASONE 6 MG/1
6 TABLET ORAL DAILY
Status: DISCONTINUED | OUTPATIENT
Start: 2022-01-13 | End: 2022-01-16 | Stop reason: HOSPADM

## 2022-01-13 RX ORDER — SODIUM CHLORIDE 0.9 % (FLUSH) 0.9 %
5-40 SYRINGE (ML) INJECTION EVERY 8 HOURS
Status: DISCONTINUED | OUTPATIENT
Start: 2022-01-13 | End: 2022-01-16 | Stop reason: HOSPADM

## 2022-01-13 RX ORDER — POLYETHYLENE GLYCOL 3350 17 G/17G
17 POWDER, FOR SOLUTION ORAL DAILY PRN
Status: DISCONTINUED | OUTPATIENT
Start: 2022-01-13 | End: 2022-01-16 | Stop reason: HOSPADM

## 2022-01-13 RX ORDER — ONDANSETRON 4 MG/1
4 TABLET, ORALLY DISINTEGRATING ORAL
Status: DISCONTINUED | OUTPATIENT
Start: 2022-01-13 | End: 2022-01-16 | Stop reason: HOSPADM

## 2022-01-13 RX ORDER — ACETAMINOPHEN 325 MG/1
650 TABLET ORAL
Status: DISCONTINUED | OUTPATIENT
Start: 2022-01-13 | End: 2022-01-16 | Stop reason: HOSPADM

## 2022-01-13 RX ORDER — ACETAMINOPHEN 650 MG/1
650 SUPPOSITORY RECTAL
Status: DISCONTINUED | OUTPATIENT
Start: 2022-01-13 | End: 2022-01-14 | Stop reason: SDUPTHER

## 2022-01-13 RX ORDER — BUMETANIDE 1 MG/1
1 TABLET ORAL
COMMUNITY
End: 2022-02-28

## 2022-01-13 RX ORDER — MAGNESIUM SULFATE 100 %
4 CRYSTALS MISCELLANEOUS AS NEEDED
Status: DISCONTINUED | OUTPATIENT
Start: 2022-01-13 | End: 2022-01-16 | Stop reason: HOSPADM

## 2022-01-13 RX ORDER — ONDANSETRON 2 MG/ML
4 INJECTION INTRAMUSCULAR; INTRAVENOUS
Status: DISCONTINUED | OUTPATIENT
Start: 2022-01-13 | End: 2022-01-16 | Stop reason: HOSPADM

## 2022-01-13 RX ORDER — ENOXAPARIN SODIUM 100 MG/ML
40 INJECTION SUBCUTANEOUS 2 TIMES DAILY
Status: DISCONTINUED | OUTPATIENT
Start: 2022-01-13 | End: 2022-01-16 | Stop reason: HOSPADM

## 2022-01-13 RX ORDER — SODIUM CHLORIDE 9 MG/ML
75 INJECTION, SOLUTION INTRAVENOUS CONTINUOUS
Status: DISCONTINUED | OUTPATIENT
Start: 2022-01-13 | End: 2022-01-13

## 2022-01-13 RX ORDER — SODIUM CHLORIDE 0.9 % (FLUSH) 0.9 %
5-40 SYRINGE (ML) INJECTION AS NEEDED
Status: DISCONTINUED | OUTPATIENT
Start: 2022-01-13 | End: 2022-01-16 | Stop reason: HOSPADM

## 2022-01-13 RX ORDER — OLMESARTAN MEDOXOMIL 40 MG/1
40 TABLET ORAL DAILY
COMMUNITY

## 2022-01-13 RX ORDER — CHOLECALCIFEROL (VITAMIN D3) 125 MCG
2000 CAPSULE ORAL DAILY
COMMUNITY

## 2022-01-13 RX ORDER — BISMUTH SUBSALICYLATE 262 MG
1 TABLET,CHEWABLE ORAL DAILY
COMMUNITY

## 2022-01-13 RX ORDER — ASCORBIC ACID 500 MG
500 TABLET ORAL DAILY
COMMUNITY

## 2022-01-13 RX ORDER — DEXAMETHASONE 6 MG/1
6 TABLET ORAL DAILY
Status: DISCONTINUED | OUTPATIENT
Start: 2022-01-13 | End: 2022-01-13

## 2022-01-13 RX ORDER — DEXTROSE 50 % IN WATER (D50W) INTRAVENOUS SYRINGE
12.5-25 AS NEEDED
Status: DISCONTINUED | OUTPATIENT
Start: 2022-01-13 | End: 2022-01-16 | Stop reason: HOSPADM

## 2022-01-13 RX ORDER — SODIUM CHLORIDE 9 MG/ML
75 INJECTION, SOLUTION INTRAVENOUS CONTINUOUS
Status: DISCONTINUED | OUTPATIENT
Start: 2022-01-13 | End: 2022-01-15

## 2022-01-13 RX ORDER — ACETAMINOPHEN 650 MG/1
650 SUPPOSITORY RECTAL
Status: DISCONTINUED | OUTPATIENT
Start: 2022-01-13 | End: 2022-01-16 | Stop reason: HOSPADM

## 2022-01-13 RX ORDER — ACETAMINOPHEN 325 MG/1
650 TABLET ORAL
Status: DISCONTINUED | OUTPATIENT
Start: 2022-01-13 | End: 2022-01-14 | Stop reason: SDUPTHER

## 2022-01-13 RX ORDER — INSULIN LISPRO 100 [IU]/ML
INJECTION, SOLUTION INTRAVENOUS; SUBCUTANEOUS
Status: DISCONTINUED | OUTPATIENT
Start: 2022-01-13 | End: 2022-01-16 | Stop reason: HOSPADM

## 2022-01-13 RX ADMIN — SODIUM CHLORIDE 1000 ML: 9 INJECTION, SOLUTION INTRAVENOUS at 12:08

## 2022-01-13 RX ADMIN — ACETAMINOPHEN 650 MG: 325 TABLET ORAL at 21:35

## 2022-01-13 RX ADMIN — ENOXAPARIN SODIUM 40 MG: 100 INJECTION SUBCUTANEOUS at 18:39

## 2022-01-13 RX ADMIN — BARICITINIB 4 MG: 2 TABLET, FILM COATED ORAL at 18:40

## 2022-01-13 RX ADMIN — SODIUM CHLORIDE 75 ML/HR: 9 INJECTION, SOLUTION INTRAVENOUS at 18:38

## 2022-01-13 RX ADMIN — INSULIN LISPRO 2 UNITS: 100 INJECTION, SOLUTION INTRAVENOUS; SUBCUTANEOUS at 18:39

## 2022-01-13 RX ADMIN — Medication 10 ML: at 18:46

## 2022-01-13 RX ADMIN — DEXAMETHASONE 6 MG: 6 TABLET ORAL at 18:40

## 2022-01-13 RX ADMIN — Medication 10 ML: at 21:30

## 2022-01-13 NOTE — ED PROVIDER NOTES
Mr. Mili Jara is a 59yo male who presents to the ER with complaints of shortness of breath. He states that he began to have symptoms approximately 5 to 6 days ago. He has progressively felt more fatigued and short of breath. [facility]. Just he was leaving his house, his wife received results that she tested positive by PCR for COVID. He has a cough, congestion, and fatigue. He has had some diarrhea. No swelling in his legs. He denies any other complaints. Past Medical History:   Diagnosis Date    [facility] ([facility]     Diabetes ([facility]     Hypertension        No past surgical history on file. No family history on file. Social History     Socioeconomic History    Marital status:      Spouse name: Not on file    Number of children: Not on file    Years of education: Not on file    Highest education level: Not on file   Occupational History    Not on file   Tobacco Use    Smoking status: Former Smoker    Smokeless tobacco: Never Used   Vaping Use    Vaping Use: Never used   Substance and Sexual Activity    Alcohol use: Yes    Drug use: Never    Sexual activity: Not on file   Other Topics Concern    Not on file   Social History Narrative    Not on file     Social Determinants of Health     Financial Resource Strain:     Difficulty of Paying Living Expenses: Not on file   Food Insecurity:     Worried About Running Out of Food in the Last Year: Not on file    Branden of Food in the Last Year: Not on file   Transportation Needs:     Lack of Transportation (Medical): Not on file    Lack of Transportation (Non-Medical):  Not on file   Physical Activity:     Days of Exercise per Week: Not on file    Minutes of Exercise per Session: Not on file   Stress:     Feeling of Stress : Not on file   Social Connections:     Frequency of Communication with Friends and Family: Not on file    Frequency of Social Gatherings with Friends and Family: Not on file    Attends Voodoo Services: Not on file    Active Member of Clubs or Organizations: Not on file    Attends Club or Organization Meetings: Not on file    Marital Status: Not on file   Intimate Partner Violence:     Fear of Current or Ex-Partner: Not on file    Emotionally Abused: Not on file    Physically Abused: Not on file    Sexually Abused: Not on file   Housing Stability:     Unable to Pay for Housing in the Last Year: Not on file    Number of [place] in the Last Year: Not on file    Unstable Housing in the Last Year: Not on file         ALLERGIES: Shellfish derived    Review of Systems   Constitutional: Positive for fatigue. Negative for chills. HENT: Positive for congestion. Respiratory: Positive for cough and shortness of breath. Cardiovascular: Negative for chest pain. Gastrointestinal: Negative for abdominal pain, diarrhea, nausea and vomiting. Genitourinary: Negative for dysuria and hematuria. Musculoskeletal: Negative for arthralgias and myalgias. Skin: Negative for pallor and rash. Neurological: Negative for dizziness, weakness and light-headedness. All other systems reviewed and are negative. Vitals:    01/13/22 1139   BP: (!) 151/77   Pulse: (!) 105   Resp: 30   Temp: 99.7 °F (37.6 °C)   SpO2: 97%   Weight: 141 kg (310 lb 13.6 oz)   Height: 6' 1\" (1.854 m)            Physical Exam     Vital signs reviewed. Nursing notes reviewed.     Const:  No acute distress, well developed, well nourished  Head:  Atraumatic, normocephalic  Eyes:  PERRL, conjunctiva normal, no scleral icterus  Neck:  Supple, trachea midline  Cardiovascular: Regular rate  Resp:   Moderate resp distress, increased work of breathing  Abd:  Soft, non-tender, non-distended  MSK:  No pedal edema, normal ROM  Neuro:  Alert and oriented x3, no cranial nerve defect  Skin:  Warm, dry, intact  Psych: normal mood and affect, behavior is normal, judgement and thought content is normal          MDM  Number of Diagnoses or Management Options     Amount and/or Complexity of Data Reviewed  Clinical lab tests: ordered and reviewed  Tests in the radiology section of CPT®: reviewed and ordered  Review and summarize past medical records: yes    Patient Progress  Patient progress: stable            Total critical care time spent exclusive of procedures:  35 min. Perfect Serve Consult for Admission  1:46 PM    ED Room Number: YY35/61  Patient Name and age:  Pao Garner 61 y.o.  male  Working Diagnosis:   1. Pneumonia due to COVID-19 virus    2. Acute respiratory failure with hypoxia ([address]        COVID-19 Suspicion:  yes  Sepsis present:  no  Reassessment needed: no  Readmission: no  Isolation Requirements:  yes  Recommended Level of Care:  telemetry  Department:[facility] ED - [phone]        Mr. Lawrence Yu is a 57yo male who presents to the ER with complaints of SOB. He was hypoxic in the ER. Pt. Found to have covid pneumonia. Pt.  To be evaluated for admission by the hospitalist.          Procedures

## 2022-01-13 NOTE — H&P
[facility]  [address] [facility], [facility] [address]  [phone]    Admission History and Physical      NAME:  Judy Garcia   :      MRN:  [id number]     PCP:  Elena Pollack MD     Date of service:  2022         Subjective:     CHIEF COMPLAINT: Cough and shortness of breath    HISTORY OF PRESENT ILLNESS:     Mr. Da Wilde is a 61 y.o.  male who is admitted with acute respiratory failure with hypoxia. Mr. Da Wilde with past medical history of DM, HTN, obesity presented to ER complaining of cough and shortness of breath which started about 6 days ago. The cough is productive associated with shortness of breath. Denies chest pain. Had low-grade fever. Patient is unvaccinated. His wife is also tested positive for COVID-19. Past Medical History:   Diagnosis Date    [facility] ([facility] [facility]     Diabetes ([facility] [facility]     Hypertension         No past surgical history on file. Social History     Tobacco Use    Smoking status: Former Smoker    Smokeless tobacco: Never Used   Substance Use Topics    Alcohol use: Yes        No family history on file. Allergies   Allergen Reactions    Shellfish Derived Nausea Only        Prior to Admission medications    Medication Sig Start Date End Date Taking? Authorizing Provider   cloNIDine HCL (CATAPRES) 0.1 mg tablet  10/4/21   Provider, Historical   diclofenac EC (VOLTAREN) 50 mg EC tablet  21   Provider, Historical   Trulicity 6.63 KF/5.5 mL sub-q pen  21   Provider, Historical   enalapril-hydroCHLOROthiazide (VASERETIC) 10-25 mg tablet enalapril 10 mg-hydrochlorothiazide 25 mg tablet   Take 1 tablet every day by oral route. Provider, Historical   metFORMIN (GLUCOPHAGE) 500 mg tablet  21   Provider, Historical   erythromycin (ILOTYCIN) ophthalmic ointment  21   Provider, Historical   cyclobenzaprine (FLEXERIL) 10 mg tablet Take 1 Tablet by mouth nightly as needed for Muscle Spasm(s).  21 Dante Thomas MD   metoprolol succinate (TOPROL-XL) 100 mg tablet Take 100 mg by mouth daily. Provider, Historical   ibuprofen (MOTRIN) 200 mg tablet Take 200 mg by mouth every six (6) hours as needed for Pain. Provider, Historical   aspirin 81 mg chewable tablet Take 81 mg by mouth daily. Provider, Historical   atorvastatin (Lipitor) 20 mg tablet Take 1 Tab by mouth daily.  Indications: treatment to slow progression of coronary artery disease 3/8/21   Alli Hernandez MD         Review of Systems:  (bold if positive, if negative)    Gen:  Eyes:  ENT:  CVS:  Pulm:  Cough, dyspnea,GI:  :  MS:  Skin:  Psych:  Endo:  Hem:  Renal:  Neuro:            Objective:      VITALS:    Vital signs reviewed; most recent are:    Visit Vitals  BP (!) 151/77   Pulse (!) 104   Temp 99.7 °F (37.6 °C)   Resp 30   Ht 6' 1\" (1.854 m)   Wt 141 kg (310 lb 13.6 oz)   SpO2 94%   BMI 41.01 kg/m²     SpO2 Readings from Last 6 Encounters:   01/13/22 94%   03/08/21 99%    O2 Flow Rate (L/min): 2 l/min   No intake or output data in the 24 hours ending 01/13/22 1445         Exam:     Physical Exam:    Gen:  Well-developed, well-nourished, in no acute distress  HEENT:  Pink conjunctivae, PERRL, hearing intact to voice, moist mucous membranes  Neck:  Supple, without masses, thyroid non-tender  Resp:  No accessory muscle use, clear breath sounds without wheezes rales or rhonchi  Card:  No murmurs, normal S1, S2 without thrills, bruits or peripheral edema  Abd:  Soft, non-tender, non-distended, normoactive bowel sounds are present, no palpable organomegaly and no detectable hernias  Lymph:  No cervical or inguinal adenopathy  Musc:  No cyanosis or clubbing  Skin:  No rashes or ulcers, skin turgor is good  Neuro:  Cranial nerves are grossly intact, no focal motor weakness, follows commands appropriately  Psych:  Good insight, oriented to person, place and time, alert       Labs:    Recent Labs     01/13/22  1140   WBC 6.0   HGB 13.5   HCT 38.7        Recent Labs     01/13/22  1140   *   K 4.0   CL 99   CO2 27   *   BUN 8   CREA 0.80   CA 8.9   ALB 2.5*   TBILI 1.6*   ALT 76     No results found for: GLUCPOC  No results for input(s): PH, PCO2, PO2, HCO3, FIO2 in the last 72 hours. No results for input(s): INR, INREXT in the last 72 hours. Telemetry reviewed:           Assessment/Plan:    1. Acute respiratory failure with hypoxia ([facility] (1/13/2022) due to COVID-19 pneumonia. Supplemental oxygen to keep SaO2 greater than 90%    2. Pneumonia due to COVID-19 virus (1/13/2022). Unvaccinated. Start dexamethasone and baricitinib per pharmacy dosing. Check inflammatory markers. 3.  Hyponatremia (1/13/2022). Likely secondary to volume depletion. Hold HCTZ. Start normal saline IV fluids    4. HTN (hypertension) (1/13/2022). Hold HCTZ due to hyponatremia. Continue home medications    5. DM (diabetes mellitus) ([facility] [address] (1/13/2022). Hold metformin. Cover with sliding scale    6. Obesity. Would benefit from weight loss    7. Abnormal LFT. likely due to COVID.  Monitor closely      Previous medical records reviewed     Risk of deterioration: high      Total time spent with patient: 79 [address] [facility] discussed with: Patient, Nursing Staff and >50% of time spent in counseling and coordination of care    Discussed:  Care Plan    Prophylaxis:  Lovenox    Probable Disposition:  Home w/Family           ___________________________________________________    Attending Physician: Kory Jensen MD

## 2022-01-13 NOTE — ED TRIAGE NOTES
Patient presents to ED via EMS for reported shortness of breath and cough since Saturday. [facility] EMS reports room air sats in high 80s so patient placed on 4 liters of oxygen with sats that improved to high 90s en route. Patient denies any pain. Wife reportedly diagnosed with covid pneumonia since this morning. Patient has not been vaccinated.

## 2022-01-13 NOTE — PROGRESS NOTES
[facility] Dosing Services: 1/13/2022    Consult for Enoxaparin by Dr. Cherelle swenson for this 61 y.o. male, for prophylaxis of  DVT. Wt Readings from Last 1 Encounters:   01/13/22 141 kg (310 lb 13.6 oz)       Ht Readings from Last 1 Encounters:   01/13/22 185.4 cm (73\")       Lovenox dose adjusted to 40 mg Q12 hrs for BMI >30 kg/m2 per COVID-19 DVT prophylaxis protocol. Baseline D Dimer not available for review. No documentation active clot per imaging or notes. Previous Dose Lovenox 30 mg Q12 hrs ppx   Creatinine Clearance Estimated Creatinine Clearance: 150.3 mL/min (based on SCr of 0.8 mg/dL). Creatinine Lab Results   Component Value Date/Time    Creatinine 0.80 01/13/2022 11:40 AM       Platelet Lab Results   Component Value Date/Time    PLATELET 737 88/47/6905 11:40 AM      H/H Lab Results   Component Value Date/Time    HGB 13.5 01/13/2022 11:40 AM        Pharmacist made change to enoxaparin therapy based on  [ X ] BMI    Pharmacy to automatically make dose adjustment for renal dysfunction (creatinine clearance less than 30 mL/min)  Pharmacy to automatically make dose adjustment for obesity (BMI greater than 40)  Pharmacy to make dose rounding adjustments per [facility] dose adjustment scale. Pharmacy to monitor patients progress. Will make dose adjustment as needed per changing renal function. Will communicate further recommendations regarding patients anticoagulation therapy with prescriber.     Signed Marii Goff, [address] information: [phone]

## 2022-01-13 NOTE — PROGRESS NOTES
CARE MANAGEMENT INITIAL ASSESSEMENT      NAME:   Boby Swanson   :     1965   MRN:     [id number]       Emergency Contact:  Extended Emergency Contact Information  Primary Emergency Contact: Orville Dsouza  Address: [address] Phone: [phone]  Mobile Phone: [phone]  Relation: Spouse   needed? No    Advance Directive:  Full Code, does not have an advance directive. CM unable to complete ACP with Pt as Pt did not answer the phone. Healthcare Decision Maker:   Sariah Rodriguez- spouse- [phone]    Reason for Admission:  Mr. Neptali Coreas is a 61 y.o. male with history that includes DM and HTN  who was emergently admitted for:  acute respiratory failure with hypoxia due to COVID. Patient Active Problem List   Diagnosis Code    Acute respiratory failure with hypoxia (HCC) J96.01    Hyponatremia E87.1    HTN (hypertension) I10    DM (diabetes mellitus) ([facility] E11.9    Pneumonia due to COVID-19 virus U07.1, J12.82       Assessment:  Via phone with spouse Sb Emmanuel. RUR:  7%  Risk Level:  Low  Value-based purchasing:   No  Bundle patient:  No    Residency:  Private residence  Exterior Steps:  3-4  Interior Steps:  14. Pt's bedroom is upstairs. Spouse denies any problems with Pt getting up/down stairs. Lives With:  Spouse    Prior functioning:  Independent. Patient requires assistance with:  N/A    Prior DME required:  None    DME available:  None    Rehab history:  None    Discharge Concerns:  None      Insurer:  Payor: Chan Duque / Plan: [address] [place] [address] / Product Type: PPO /     PCP: Bhavani Duong MD   Name of Practice:  [facility]   Current patient: Yes   Approximate date of last visit:    Access to virtual PCP visits:   No    Pharmacy:  [address] Pt denies any problems obtaining/taking medications.       COVID-19 vaccination status:  Not vaccinated    DC Transport: Family      Transition of care plan:  Home with outpatient follow-up     Comments:   Pt admitted on 1/13/22 for acute respiratory failure with hypoxia. CM was unable to reach Pt via room phone after multiple attempts. CM completed assessment via phone with spouse. Pt lives at home with spouse. Pt has no hx of HH, home O2, or equipment. Pt is independent with ADLs and ambulation. Pt denies problems with either. Discharge plan is for Pt to return home. Spouse states family will transport Pt home. CM will continue to follow for possible discharge needs. _____________________________________  JENELLE Victoria - Care Management  1/13/2022   5:44 PM      Care Management Interventions  PCP Verified by CM: Yes Sis Choi MD)  Mode of Transport at Discharge:  Other (see comment) (family)  Transition of Care Consult (CM Consult): Discharge Planning  MyChart Signup: No  Discharge Durable Medical Equipment: No  Physical Therapy Consult: No  Occupational Therapy Consult: No  Speech Therapy Consult: No  Support Systems: Spouse/Significant Other  Confirm Follow Up Transport: Family  Discharge Location  Discharge Placement: Home with outpatient services

## 2022-01-13 NOTE — ED NOTES
TRANSFER - OUT REPORT:    Verbal report given to Andrew Patel RN(name) on William Duran  being transferred to 4th floor - medical(unit) for routine progression of care       Report consisted of patients Situation, Background, Assessment and   Recommendations(SBAR). Information from the following report(s) SBAR, Kardex, OR Summary, Intake/Output, MAR, Recent Results and Cardiac Rhythm NSR was reviewed with the receiving nurse. Lines:   Peripheral IV 01/13/22 Left Arm (Active)        Opportunity for questions and clarification was provided.       Patient transported with:   O2 @ 2 liters  Tech - transport

## 2022-01-13 NOTE — PROGRESS NOTES
[facility]: MED RECONCILIATION    Comments/Recommendations:   PTA medications confirmed with patient's spouse over the phone who is a fair historian. Due to family being ill w/ COVID-19 patient has not been able to  multiple medications in the past week and unclear as to last doses. He does have a history of taking his medications irregularly. Patient has an Rx pending for olmesartan 40 mg / HCTZ 25 mg daily however he has not filled and is still taking previous Rx for 40 mg daily from 11/21. Per spouse his physician has changed multiple medications recently and they have not had time to make these changes. Medications added:     Clopidogrel 75 mg daily  Bumex 1 mg daily PRN swelling  Vitamins C, D, Zinc, and MVI    Medications removed:    Clonidine  Vaseretic 10-25 mg tab  EES- completed for eye infection  Ibuprofen    Medications adjusted:    Diclofenac- takes up to QID  Metformin- Dose 576 mg BID  Trulicity dose 1.92 mg weekly    Information obtained from: Patient's spouse, RxQuery, Chart review    Allergies: Shellfish derived    Prior to Admission Medications:     Medication Documentation Review Audit       Reviewed by Armando Landers PHARMD (Pharmacist) on 01/13/22 at 1701      Medication Sig Documenting Provider Last Dose Status Taking?   ascorbic acid, vitamin C, (VITAMIN C) 500 mg tablet Take 500 mg by mouth daily. Provider, Historical 1/6/2022 Active Yes   aspirin 81 mg chewable tablet Take 81 mg by mouth daily. Provider, Historical 1/6/2022 Active Yes   atorvastatin (Lipitor) 20 mg tablet Take 1 Tab by mouth daily. Indications: treatment to slow progression of coronary artery disease Eddi Lloyd MD 1/6/2022 Active Yes   bumetanide (BUMEX) 1 mg tablet Take 1 mg by mouth daily as needed. Indications: visible water retention Provider, Historical 1/6/2022 Active Yes   cholecalciferol, vitamin D3, (Vitamin D3) 50 mcg (2,000 unit) tab Take 2,000 Units by mouth daily.  Provider, Historical 1/6/2022 Active Yes   clopidogreL (PLAVIX) 75 mg tab Take 75 mg by mouth daily. Provider, Historical 1/6/2022 Active Yes   cyclobenzaprine (FLEXERIL) 10 mg tablet Take 1 Tablet by mouth nightly as needed for Muscle Spasm(s). Kalina Denise MD 1/6/2022 Active Yes   diclofenac EC (VOLTAREN) 50 mg EC tablet Take 50 mg by mouth four (4) times daily as needed for Pain. Provider, Historical 1/6/2022 Active Yes   metFORMIN (GLUCOPHAGE) 500 mg tablet Take 500 mg by mouth two (2) times daily (with meals). Provider, Historical 1/6/2022 Active Yes   metoprolol succinate (TOPROL-XL) 100 mg tablet Take 100 mg by mouth daily. Provider, Historical 1/6/2022 Active Yes   multivitamin (ONE A DAY) tablet Take 1 Tablet by mouth daily. Provider, Historical 1/6/2022 Active Yes   olmesartan (BENICAR) 40 mg tablet Take 40 mg by mouth daily. Provider, Historical 1/6/2022 Active Yes   Trulicity 2.82 CG/5.3 mL sub-q pen 0.75 mg by SubCUTAneous route every seven (7) days. Provider, Historical 1/6/2022 Active Yes   zinc sulfate (ZINC-220 PO) Take 1 Caplet by mouth daily.  Provider, Historical 1/6/2022 Active Yes                  Yanelis Cabrera, PHARMD   Contact: [phone]

## 2022-01-14 LAB
ANION GAP SERPL CALC-SCNC: 4 MMOL/L (ref 5–15)
ATRIAL RATE: 101 BPM
BUN SERPL-MCNC: 9 MG/DL (ref 6–20)
BUN/CREAT SERPL: 13 (ref 12–20)
CALCIUM SERPL-MCNC: 8.9 MG/DL (ref 8.5–10.1)
CALCULATED P AXIS, ECG09: 49 DEGREES
CALCULATED R AXIS, ECG10: -4 DEGREES
CALCULATED T AXIS, ECG11: 38 DEGREES
CHLORIDE SERPL-SCNC: 104 MMOL/L (ref 97–108)
CO2 SERPL-SCNC: 26 MMOL/L (ref 21–32)
CREAT SERPL-MCNC: 0.68 MG/DL (ref 0.7–1.3)
DIAGNOSIS, 93000: NORMAL
ERYTHROCYTE [DISTWIDTH] IN BLOOD BY AUTOMATED COUNT: 12.4 % (ref 11.5–14.5)
FERRITIN SERPL-MCNC: 1592 NG/ML (ref 26–388)
GLUCOSE BLD STRIP.AUTO-MCNC: 157 MG/DL (ref 65–117)
GLUCOSE BLD STRIP.AUTO-MCNC: 159 MG/DL (ref 65–117)
GLUCOSE BLD STRIP.AUTO-MCNC: 164 MG/DL (ref 65–117)
GLUCOSE BLD STRIP.AUTO-MCNC: 221 MG/DL (ref 65–117)
GLUCOSE BLD STRIP.AUTO-MCNC: 230 MG/DL (ref 65–117)
GLUCOSE SERPL-MCNC: 168 MG/DL (ref 65–100)
HCT VFR BLD AUTO: 35.8 % (ref 36.6–50.3)
HGB BLD-MCNC: 12.2 G/DL (ref 12.1–17)
MCH RBC QN AUTO: 34.1 PG (ref 26–34)
MCHC RBC AUTO-ENTMCNC: 34.1 G/DL (ref 30–36.5)
MCV RBC AUTO: 100 FL (ref 80–99)
NRBC # BLD: 0 K/UL (ref 0–0.01)
NRBC BLD-RTO: 0 PER 100 WBC
P-R INTERVAL, ECG05: 150 MS
PLATELET # BLD AUTO: 193 K/UL (ref 150–400)
PMV BLD AUTO: 9.8 FL (ref 8.9–12.9)
POTASSIUM SERPL-SCNC: 4.3 MMOL/L (ref 3.5–5.1)
Q-T INTERVAL, ECG07: 354 MS
QRS DURATION, ECG06: 84 MS
QTC CALCULATION (BEZET), ECG08: 459 MS
RBC # BLD AUTO: 3.58 M/UL (ref 4.1–5.7)
SERVICE CMNT-IMP: ABNORMAL
SODIUM SERPL-SCNC: 134 MMOL/L (ref 136–145)
VENTRICULAR RATE, ECG03: 101 BPM
WBC # BLD AUTO: 4 K/UL (ref 4.1–11.1)

## 2022-01-14 PROCEDURE — 82962 GLUCOSE BLOOD TEST: CPT

## 2022-01-14 PROCEDURE — 82728 ASSAY OF FERRITIN: CPT

## 2022-01-14 PROCEDURE — 74011636637 HC RX REV CODE- 636/637: Performed by: INTERNAL MEDICINE

## 2022-01-14 PROCEDURE — 74011250636 HC RX REV CODE- 250/636: Performed by: INTERNAL MEDICINE

## 2022-01-14 PROCEDURE — 74011000250 HC RX REV CODE- 250: Performed by: INTERNAL MEDICINE

## 2022-01-14 PROCEDURE — 74011250637 HC RX REV CODE- 250/637: Performed by: HOSPITALIST

## 2022-01-14 PROCEDURE — 65270000029 HC RM PRIVATE

## 2022-01-14 PROCEDURE — 74011636637 HC RX REV CODE- 636/637: Performed by: HOSPITALIST

## 2022-01-14 PROCEDURE — 74011250637 HC RX REV CODE- 250/637: Performed by: INTERNAL MEDICINE

## 2022-01-14 RX ORDER — BENZONATATE 100 MG/1
100 CAPSULE ORAL 3 TIMES DAILY
Status: DISCONTINUED | OUTPATIENT
Start: 2022-01-14 | End: 2022-01-16 | Stop reason: HOSPADM

## 2022-01-14 RX ORDER — ATORVASTATIN CALCIUM 20 MG/1
20 TABLET, FILM COATED ORAL DAILY
Status: DISCONTINUED | OUTPATIENT
Start: 2022-01-14 | End: 2022-01-16 | Stop reason: HOSPADM

## 2022-01-14 RX ORDER — ASCORBIC ACID 500 MG
500 TABLET ORAL DAILY
Status: DISCONTINUED | OUTPATIENT
Start: 2022-01-14 | End: 2022-01-16 | Stop reason: HOSPADM

## 2022-01-14 RX ORDER — METOPROLOL SUCCINATE 50 MG/1
100 TABLET, EXTENDED RELEASE ORAL DAILY
Status: DISCONTINUED | OUTPATIENT
Start: 2022-01-14 | End: 2022-01-16 | Stop reason: HOSPADM

## 2022-01-14 RX ORDER — VALSARTAN 80 MG/1
160 TABLET ORAL DAILY
Status: DISCONTINUED | OUTPATIENT
Start: 2022-01-14 | End: 2022-01-16 | Stop reason: HOSPADM

## 2022-01-14 RX ORDER — OLMESARTAN MEDOXOMIL 20 MG/1
40 TABLET ORAL DAILY
Status: DISCONTINUED | OUTPATIENT
Start: 2022-01-14 | End: 2022-01-14

## 2022-01-14 RX ORDER — INSULIN GLARGINE 100 [IU]/ML
10 INJECTION, SOLUTION SUBCUTANEOUS
Status: DISCONTINUED | OUTPATIENT
Start: 2022-01-14 | End: 2022-01-16 | Stop reason: HOSPADM

## 2022-01-14 RX ORDER — GUAIFENESIN 100 MG/5ML
81 LIQUID (ML) ORAL DAILY
Status: DISCONTINUED | OUTPATIENT
Start: 2022-01-14 | End: 2022-01-16 | Stop reason: HOSPADM

## 2022-01-14 RX ORDER — GUAIFENESIN/DEXTROMETHORPHAN 100-10MG/5
10 SYRUP ORAL
Status: DISCONTINUED | OUTPATIENT
Start: 2022-01-14 | End: 2022-01-16 | Stop reason: HOSPADM

## 2022-01-14 RX ORDER — ZINC GLUCONATE 50 MG
50 TABLET ORAL DAILY
Status: DISCONTINUED | OUTPATIENT
Start: 2022-01-14 | End: 2022-01-16 | Stop reason: HOSPADM

## 2022-01-14 RX ORDER — CLOPIDOGREL BISULFATE 75 MG/1
75 TABLET ORAL DAILY
Status: DISCONTINUED | OUTPATIENT
Start: 2022-01-14 | End: 2022-01-16 | Stop reason: HOSPADM

## 2022-01-14 RX ADMIN — Medication 10 ML: at 21:47

## 2022-01-14 RX ADMIN — SODIUM CHLORIDE 75 ML/HR: 9 INJECTION, SOLUTION INTRAVENOUS at 16:20

## 2022-01-14 RX ADMIN — CLOPIDOGREL BISULFATE 75 MG: 75 TABLET, FILM COATED ORAL at 08:42

## 2022-01-14 RX ADMIN — Medication 500 MG: at 08:42

## 2022-01-14 RX ADMIN — INSULIN LISPRO 3 UNITS: 100 INJECTION, SOLUTION INTRAVENOUS; SUBCUTANEOUS at 10:58

## 2022-01-14 RX ADMIN — METOPROLOL SUCCINATE 100 MG: 50 TABLET, EXTENDED RELEASE ORAL at 08:42

## 2022-01-14 RX ADMIN — BENZONATATE 100 MG: 100 CAPSULE ORAL at 16:20

## 2022-01-14 RX ADMIN — VALSARTAN 160 MG: 80 TABLET, FILM COATED ORAL at 08:42

## 2022-01-14 RX ADMIN — DEXAMETHASONE 6 MG: 6 TABLET ORAL at 08:42

## 2022-01-14 RX ADMIN — SODIUM CHLORIDE 75 ML/HR: 9 INJECTION, SOLUTION INTRAVENOUS at 03:29

## 2022-01-14 RX ADMIN — INSULIN GLARGINE 10 UNITS: 100 INJECTION, SOLUTION SUBCUTANEOUS at 21:55

## 2022-01-14 RX ADMIN — Medication 50 MG: at 08:42

## 2022-01-14 RX ADMIN — Medication 10 ML: at 16:20

## 2022-01-14 RX ADMIN — ASPIRIN 81 MG: 81 TABLET, CHEWABLE ORAL at 08:42

## 2022-01-14 RX ADMIN — ENOXAPARIN SODIUM 40 MG: 100 INJECTION SUBCUTANEOUS at 21:46

## 2022-01-14 RX ADMIN — ENOXAPARIN SODIUM 40 MG: 100 INJECTION SUBCUTANEOUS at 08:42

## 2022-01-14 RX ADMIN — BENZONATATE 100 MG: 100 CAPSULE ORAL at 21:46

## 2022-01-14 RX ADMIN — Medication 10 ML: at 03:29

## 2022-01-14 RX ADMIN — INSULIN LISPRO 2 UNITS: 100 INJECTION, SOLUTION INTRAVENOUS; SUBCUTANEOUS at 16:22

## 2022-01-14 RX ADMIN — BARICITINIB 4 MG: 2 TABLET, FILM COATED ORAL at 08:42

## 2022-01-14 RX ADMIN — ATORVASTATIN CALCIUM 20 MG: 20 TABLET, FILM COATED ORAL at 08:41

## 2022-01-14 RX ADMIN — INSULIN LISPRO 2 UNITS: 100 INJECTION, SOLUTION INTRAVENOUS; SUBCUTANEOUS at 08:42

## 2022-01-14 NOTE — PROGRESS NOTES
Hospitalist Progress Note    NAME: Jaren Coelho   :  1965   MRN:  [id number]     Subjective:   Daily Progress Note: 2022 11:45 AM      Chief complaint: Admitted with COVID-pneumonia  Patient seen and examined chart was reviewed. Patient on 2 L nasal cannula oxygen, feels weak overall. No other acute issues reported to me by patient or staff at this time.        Current Facility-Administered Medications   Medication Dose Route Frequency    ascorbic acid (vitamin C) (VITAMIN C) tablet 500 mg  500 mg Oral DAILY    aspirin chewable tablet 81 mg  81 mg Oral DAILY    atorvastatin (LIPITOR) tablet 20 mg  20 mg Oral DAILY    clopidogreL (PLAVIX) tablet 75 mg  75 mg Oral DAILY    metoprolol succinate (TOPROL-XL) XL tablet 100 mg  100 mg Oral DAILY    zinc gluconate tablet 50 mg  50 mg Oral DAILY    valsartan (DIOVAN) tablet 160 mg  160 mg Oral DAILY    insulin glargine (LANTUS) injection 10 Units  10 Units SubCUTAneous QHS    benzonatate (TESSALON) capsule 100 mg  100 mg Oral TID    guaiFENesin-dextromethorphan (ROBITUSSIN DM) 100-10 mg/5 mL syrup 10 mL  10 mL Oral Q6H PRN    sodium chloride (NS) flush 5-40 mL  5-40 mL IntraVENous Q8H    sodium chloride (NS) flush 5-40 mL  5-40 mL IntraVENous PRN    acetaminophen (TYLENOL) tablet 650 mg  650 mg Oral Q6H PRN    Or    acetaminophen (TYLENOL) suppository 650 mg  650 mg Rectal Q6H PRN    polyethylene glycol (MIRALAX) packet 17 g  17 g Oral DAILY PRN    ondansetron (ZOFRAN ODT) tablet 4 mg  4 mg Oral Q8H PRN    Or    ondansetron (ZOFRAN) injection 4 mg  4 mg IntraVENous Q6H PRN    insulin lispro (HUMALOG) injection   SubCUTAneous AC&HS    glucose chewable tablet 16 g  4 Tablet Oral PRN    dextrose (D50W) injection syrg 12.5-25 g  12.5-25 g IntraVENous PRN    glucagon (GLUCAGEN) injection 1 mg  1 mg IntraMUSCular PRN    enoxaparin (LOVENOX) injection 40 mg  40 mg SubCUTAneous BID    acetaminophen (TYLENOL) tablet 650 mg  650 mg Oral Q6H PRN    Or    acetaminophen (TYLENOL) suppository 650 mg  650 mg Rectal Q6H PRN    baricitinib (OLUMIANT) tablet 4 mg  4 mg Oral DAILY    0.9% sodium chloride infusion  75 mL/hr IntraVENous CONTINUOUS    dexAMETHasone (DECADRON) tablet 6 mg  6 mg Oral DAILY          Objective:     Visit Vitals  /82 (BP 1 Location: Right upper arm, BP Patient Position: Sitting)   Pulse 71   Temp 97.5 °F (36.4 °C)   Resp 18   Ht 6' 1\" (1.854 m)   Wt 141 kg (310 lb 13.6 oz)   SpO2 96%   BMI 41.01 kg/m²    O2 Flow Rate (L/min): 2 l/min O2 Device: Nasal cannula    Temp (24hrs), Av.5 °F (36.9 °C), Min:97.5 °F (36.4 °C), Max:100.1 °F (37.8 °C)        PHYSICAL EXAM:  General exam obese  Neck Short  CVS regular rhythm  Respiratory symmetric expansion  Abdomen obese  Extremities nonpitting edema  Neuro AA O x3  Psych normal affect  Skin no visible rash        Data Review    Recent Results (from the past 24 hour(s))   COVID-19 RAPID TEST    Collection Time: 22 12:07 PM   Result Value Ref Range    Specimen source Nasopharyngeal      COVID-19 rapid test Detected (A) NOTD     GLUCOSE, POC    Collection Time: 22  8:58 PM   Result Value Ref Range    Glucose (POC) 144 (H) 65 - 117 mg/dL    Performed by Sheron Murrieta ([employer])    CBC W/O DIFF    Collection Time: 22 11:56 PM   Result Value Ref Range    WBC 4.0 (L) 4.1 - 11.1 K/uL    RBC 3.58 (L) 4.10 - 5.70 M/uL    HGB 12.2 12.1 - 17.0 g/dL    HCT 35.8 (L) 36.6 - 50.3 %    .0 (H) 80.0 - 99.0 FL    MCH 34.1 (H) 26.0 - 34.0 PG    MCHC 34.1 30.0 - 36.5 g/dL    RDW 12.4 11.5 - 14.5 %    PLATELET 586 286 - 201 K/uL    MPV 9.8 8.9 - 12.9 FL    NRBC 0.0 0  WBC    ABSOLUTE NRBC 0.00 0.00 - 9.25 K/uL   METABOLIC PANEL, BASIC    Collection Time: 22 11:56 PM   Result Value Ref Range    Sodium 134 (L) 136 - 145 mmol/L    Potassium 4.3 3.5 - 5.1 mmol/L    Chloride 104 97 - 108 mmol/L    CO2 26 21 - 32 mmol/L    Anion gap 4 (L) 5 - 15 mmol/L    Glucose 168 (H) 65 - 100 mg/dL    BUN 9 6 - 20 MG/DL    Creatinine 0.68 (L) 0.70 - 1.30 MG/DL    BUN/Creatinine ratio 13 12 - 20      GFR est AA >60 >60 ml/min/1.73m2    GFR est non-AA >60 >60 ml/min/1.73m2    Calcium 8.9 8.5 - 10.1 MG/DL   GLUCOSE, POC    Collection Time: 01/14/22  6:16 AM   Result Value Ref Range    Glucose (POC) 164 (H) 65 - 117 mg/dL    Performed by [id number] ([employer])    GLUCOSE, POC    Collection Time: 01/14/22  8:12 AM   Result Value Ref Range    Glucose (POC) 230 (H) 65 - 117 mg/dL    Performed by Eula Pope RN    GLUCOSE, POC    Collection Time: 01/14/22 10:45 AM   Result Value Ref Range    Glucose (POC) 221 (H) 65 - 117 mg/dL    Performed by Jamin Estimable      No results found for this visit on 01/13/22. All Micro Results     Procedure Component Value Units Date/Time    COVID-19 RAPID TEST [[id number]]  (Abnormal) Collected: 01/13/22 1207    Order Status: Completed Specimen: Nasopharyngeal Updated: 01/13/22 1235     Specimen source Nasopharyngeal        COVID-19 rapid test Detected        Comment: Rapid [employer] ID Now       The specimen is POSITIVE for SARS-CoV-2, the novel coronavirus associated with COVID-19. This test has been authorized by the FDA under an Emergency Use Authorization (EUA) for use by authorized laboratories. Fact sheet for Healthcare Providers: ConventionUpdate.co.nz  Fact sheet for Patients: ConventionUpdate.co.nz       Methodology: Isothermal Nucleic Acid Amplification  CALLED TO AND READ BACK BY  CHRISTIANO MARTIN AT 1234/LO              CXR IMPRESSION  Bilateral pulmonary infiltrates concerning for atypical viral  pneumonia.          Assessment/Plan:     Acute   Hypoxia from  COVID-19 pneumonia.  wean O2 keep sats >93%,   Unvaccinated. On  dexamethasone and baricitinib per pharmacy dosing. Daily inflammatory markers. IS/self proning as tolerated     Hyponatremia  Mild 134   Hold HCTZ.   Monitor Na     HTN   Hold HCTZ Continue home medications. Monitor BP     DM Ty2  Hold metformin. ISS/lantus monitor BG     Obesity. Would benefit from weight loss     Mild Abnormal LFT. likely due to COVID.  Monitor closely     ADFC  NOK  DVT Prx lovenox    Dispo: 48hrs if medically progressing noted    Signed By: Willard Bonilla MD     January 14, 2022

## 2022-01-14 NOTE — PROGRESS NOTES
1/14/2022 12:16 PM   Care Management Progress Note      ICD-10-CM ICD-9-CM    1. Pneumonia due to COVID-19 virus  U07.1 480.8     J12.82 079.89    2. Acute respiratory failure with hypoxia (HCC)  J96.01 518.81        RUR: 8%  Risk Level: [x]Low []Moderate []High  Value-based purchasing: [] Yes [x] No  Bundle patient: [] Yes [x] No   Specify:     Transition of care plan:  1. On 2L of supplemental O2   2. Home with family at discharge, TBD on discharge needs at this time  3. Outpatient follow-up.   4. Pt's family to transport

## 2022-01-14 NOTE — ADT AUTH CERT NOTES
INPATIENT ADMISSION NOTIF     ADMISSION DATE 2022    STILL IN [place]     UR CONTACT  ERNST MORALES   [phone]  [place] [address] [phone]    PLEASE FAX BACK AUTH REF#, STATUS AND CLINICAL NEEDS TO Kalia Norwood [phone]    THE [facility] SO  MUCH! [address]      FACILITY NPI :[id number]  FACILITY TAX ID : [id number]     [facility]  SFM 4M POST SURG ORT 2  [facility]  [phone]            Patient Name :Jorge L Al   : [birth year] (61 yrs)  MRN : [id number]     Patient Mailing Address  [id number] ,                                                             [address]                                                               .         Insurance Plan Payor: BLUE CROSS     Subscriber ID : [id number]          Current Patient Class : INPATIENT  Admit Date : 2022     REQUESTED LEVEL OF CARE: INPATIENT [101]                                                           Diagnosis : Acute respiratory failure with hypoxia ([address]                          ICD10 Code : Acute respiratory failure with hypoxia ([address] [J96.01]        Admitting and Attending Info:  Admitting Provider : Melissa Donnelly MD     NPI: [id number]  Admitting Provider Phone. [phone]  Admitting Provider Address: SAME AS FACILITY      Patient Demographics    Patient Name   Bertha Martinez Legal Sex   Male    1965 Address   [address] [place] [address] Phone   [phone] (Home)   [phone] [facility]       Hospital Account    Name Acct ID Class Status Primary Coverage   Bertha Martinez [id number] INPATIENT Open BLUE CROSS - [address] [place] [address]            Guarantor Account (for Hospital Account [de-identified])    Name Relation to Pt Service Area Active?  Acct Type   Bertha Martinez Self [address] [facility] [address] [place] [address] [phone](V)              Coverage Information (for Hospital Account [de-identified])    F/O Payor/Plan Subscriber  Subscriber Sex Precert #   BLUE CROSS/VA BLUE CROSS OUT OF STATE 63 [facility] CENTER    Subscriber Subscriber #   Margoth Mahmood [id number]   Grp # Group Name   Dayton Barajas   Address Phone   [address]    Policy Number Status Effective Date Benefits Phone   [id number] -  -   Auth/Cert               Diagnosis     Codes Comments   Pneumonia due to COVID-19 virus  ICD-10-CM: U07.1, J12.82   ICD-9-CM: 480.8, 079.89             Admission Information    Arrival Date/Time: 2022 1134 Admit Date/Time: 2022 1134 IP Adm.  Date/Time: 2022 1358   Admission Type: Emergency Point of Origin: Non-health Care Facility/self Admit Category:    Means of Arrival: Ambulance Primary Service: Medicine Secondary Service: N/A   Transfer Source:  Service Area: [facility] Unit: SF 4M POST SURG ORT 2   Admit Provider: Ana Guzmán MD Attending Provider: Gentry Wang MD Referring Provider:      Admission Information    Attending Provider Admission Dx Admitted on   TiffanieJamir lira MD Acute respiratory failure with hypoxia ([facility] 22   Service Isolation Code Status   Medicine Droplet Plus Full Code   Allergies Advance Care Planning    Shellfish Derived Jump to the Activity       Admission Information    Unit/Bed: SF 4M POST SURG ORT 2 Service: Medicine   Admitting provider: Ana Guzmán MD Phone: [phone]   Attending provider: Mratin Izaguirre MD Phone: [phone]   PCP: Darrius Lopez MD Phone: [phone]   Admission dx:  Patient class: I   Admission type: ER       Patient Demographics    Patient Name   Nikki Downey   [id number] Legal Sex   Male    1965 Address   [address] [place] [facility] [address] Phone   [phone] (Home)   [phone] (Mobile)     H&P Notes       H&P by Ana Guzmán MD at 22 [phone] documented on ED to Hosp-Admission (Current) from 2022 in [facility] 4M POST SURG ORT 2    Author: Opal Sanchez MD Author Type: Physician Filed: 22 [id number]   Note Status: Addendum Cosign: Cosign Not Required Date of Service: 22 3608   : Opal Sanchez MD (Physician)       Prior Versions: 1. Opal Sanchez MD (Physician) at 22 1450 [facility] 16.  [address] [place], [place] [address]  [phone]     Admission History and Physical        NAME:            Hilda Hubbard   :               1965   MRN:               [id number]      PCP:                Sanchez Dick MD      Date of service:         2022          Subjective:      CHIEF COMPLAINT: Cough and shortness of breath     HISTORY OF PRESENT ILLNESS:     Mr. Kristina Castro is a 61 y.o.  male who is admitted with acute respiratory failure with hypoxia. Mr. Kristina Castro with past medical history of DM, HTN, obesity presented to ER complaining of cough and shortness of breath which started about 6 days ago. The cough is productive associated with shortness of breath. Denies chest pain. Had low-grade fever. Patient is unvaccinated. His wife is also tested positive for COVID-19.             Past Medical History:   Diagnosis Date    Cancer ([facility]      Diabetes ([facility]      Hypertension           No past surgical history on file.     Social History           Tobacco Use    Smoking status: Former Smoker    Smokeless tobacco: Never Used   Substance Use Topics    Alcohol use: Yes         No family history on file.           Allergies   Allergen Reactions    Shellfish Derived Nausea Only                 Prior to Admission medications    Medication Sig Start Date End Date Taking?  Authorizing Provider   cloNIDine HCL (CATAPRES) 0.1 mg tablet   10/4/21     Provider, Historical   diclofenac EC (VOLTAREN) 50 mg EC tablet   21     Provider, Historical   Trulicity 5.64 YX/6.9 mL sub-q pen   11/17/21     Provider, Historical   enalapril-hydroCHLOROthiazide (VASERETIC) 10-25 mg tablet enalapril 10 mg-hydrochlorothiazide 25 mg tablet   Take 1 tablet every day by oral route.       Provider, Historical   metFORMIN (GLUCOPHAGE) 500 mg tablet   11/14/21     Provider, Historical   erythromycin (ILOTYCIN) ophthalmic ointment   11/29/21     Provider, Historical   cyclobenzaprine (FLEXERIL) 10 mg tablet Take 1 Tablet by mouth nightly as needed for Muscle Spasm(s). 12/1/21     Mayelin Villatoro MD   metoprolol succinate (TOPROL-XL) 100 mg tablet Take 100 mg by mouth daily.       Provider, Historical   ibuprofen (MOTRIN) 200 mg tablet Take 200 mg by mouth every six (6) hours as needed for Pain.       Provider, Historical   aspirin 81 mg chewable tablet Take 81 mg by mouth daily.       Provider, Historical   atorvastatin (Lipitor) 20 mg tablet Take 1 Tab by mouth daily.  Indications: treatment to slow progression of coronary artery disease 3/8/21     Ani Mays MD            Review of Systems:  (bold if positive, if negative)     Gen:    Eyes:    ENT:    CVS:    Pulm:  Cough, dyspnea,  GI:    :    MS:    Skin:    Psych:    Endo:    Hem:    Renal:    Neuro:               Objective:       VITALS:    Vital signs reviewed; most recent are:     Visit Vitals  BP (!) 151/77   Pulse (!) 104   Temp 99.7 °F (37.6 °C)   Resp 30   Ht 6' 1\" (1.854 m)   Wt 141 kg (310 lb 13.6 oz)   SpO2 94%   BMI 41.01 kg/m²          SpO2 Readings from Last 6 Encounters:   01/13/22 94%   03/08/21 99%     O2 Flow Rate (L/min): 2 l/min   No intake or output data in the 24 hours ending 01/13/22 1445            Exam:      Physical Exam:     Gen:  Well-developed, well-nourished, in no acute distress  HEENT:  Pink conjunctivae, PERRL, hearing intact to voice, moist mucous membranes  Neck:  Supple, without masses, thyroid non-tender  Resp:  No accessory muscle use, clear breath sounds without wheezes rales or rhonchi  Card:  No murmurs, normal S1, S2 without thrills, bruits or peripheral edema  Abd:  Soft, non-tender, non-distended, normoactive bowel sounds are present, no palpable organomegaly and no detectable hernias  Lymph:  No cervical or inguinal adenopathy  Musc:  No cyanosis or clubbing  Skin:  No rashes or ulcers, skin turgor is good  Neuro:  Cranial nerves are grossly intact, no focal motor weakness, follows commands appropriately  Psych:  Good insight, oriented to person, place and time, alert        Labs:         Recent Labs     01/13/22  1140   WBC 6.0   HGB 13.5   HCT 38.7             Recent Labs     01/13/22  1140   *   K 4.0   CL 99   CO2 27   *   BUN 8   CREA 0.80   CA 8.9   ALB 2.5*   TBILI 1.6*   ALT 76      No results found for: GLUCPOC  No results for input(s): PH, PCO2, PO2, HCO3, FIO2 in the last 72 hours. No results for input(s): INR, INREXT in the last 72 hours.     Telemetry reviewed:             Assessment/Plan:    1. Acute respiratory failure with hypoxia ([facility] (1/13/2022) due to COVID-19 pneumonia. Supplemental oxygen to keep SaO2 greater than 90%     2. Pneumonia due to COVID-19 virus (1/13/2022). Unvaccinated. Start dexamethasone and baricitinib per pharmacy dosing. Check inflammatory markers.     3. Hyponatremia (1/13/2022). Likely secondary to volume depletion. Hold HCTZ. Start normal saline IV fluids     4. HTN (hypertension) (1/13/2022). Hold HCTZ due to hyponatremia. Continue home medications     5. DM (diabetes mellitus) ([facility] (1/13/2022). Hold metformin. Cover with sliding scale     6. Obesity. Would benefit from weight loss     7. Abnormal LFT. likely due to COVID.  Monitor closely       Previous medical records reviewed      Risk of deterioration: high         Total time spent with patient: [address] discussed with: Patient, Nursing Staff and >50% of time spent in counseling and coordination of care     Discussed:  Care Plan     Prophylaxis:  Lovenox     Probable Disposition:  Home w/Family                                                                                        ___________________________________________________     Attending Physician:   Markie Paris MD                Patient Demographics    Patient Name   Raynold Sandhoff   [id number] Legal Sex   Male    1965 Address   [place] [address]   [place] [address] [address] Phone   [phone] (Home)   [phone] (Mobile)   CSN:   [id number]     [address] [place] [address] Date: Admit Time Room Bed   2022 11:34  [[id number]] 01 [76533]       Attending Providers    Provider Pager From To   Margie Lara MD  22   Zaid Hall MD  22   Olayinka Gupta MD  22   Zaid Hall MD  22   Olayinka Gupta MD  22      Emergency Contact(s)    Name Relation Home Work Carrie Keller Spouse [phone]     Utilization Reviews         Respiratory Failure GRG - Care Day 2 (2022) by Vanessa Rain RN       Review Entered Review Status   2022 14:04 Completed      Criteria Review      Care Day: 2 Care Date: 2022 Level of Care: Inpatient Floor    Guideline Day 2    Clinical Status    ( ) * Weaning assessment performed    Interventions    (X) Inpatient interventions continue    2022 14:04:48 EST by Vanessa Rain      constuls/respiratory/medication management    * Milestone   Additional Notes   Day 2   22   In pt medical      Medications   0.9% sodium chloride infusion   Rate: 75 mL/hr Dose: 75 mL/hr   Freq: CONTINUOUS Route: IV      ascorbic acid (vitamin C) (VITAMIN C) tablet 500 mg   Dose: 500 mg   Freq: DAILY Route: PO      aspirin chewable tablet 81 mg   Dose: 81 mg   Freq: DAILY Route: PO      atorvastatin (LIPITOR) tablet 20 mg   Dose: 20 mg   Freq: DAILY Route: PO      baricitinib (OLUMIANT) tablet 4 mg Dose: 4 mg   Freq: DAILY Route: PO      clopidogreL (PLAVIX) tablet 75 mg   Dose: 75 mg   Freq: DAILY Route: PO      dexAMETHasone (DECADRON) tablet 6 mg   Dose: 6 mg   Freq: DAILY Route: PO      enoxaparin (LOVENOX) injection 40 mg   Dose: 40 mg   Freq: 2 TIMES DAILY Route: SC X 1      insulin lispro (HUMALOG) injection   Freq: 4 TIMES DAILY BEFORE MEALS & NIGHTLY Route: SC X 2 according to sliding scale      metoprolol succinate (TOPROL-XL) XL tablet 100 mg   Dose: 100 mg   Freq: DAILY Route: PO      valsartan (DIOVAN) tablet 160 mg   Dose: 160 mg   Freq: DAILY Route: PO      zinc gluconate tablet 50 mg   Dose: 50 mg   Freq: DAILY Route: PO      Vitals   97.5;71;122/82;18;96% RA      Attending   Chief complaint: Admitted with COVID-pneumonia   Patient seen and examined chart was reviewed. Patient on 2 L nasal cannula oxygen, feels weak overall. No other acute issues reported to me by patient or staff at this time. Assessment/Plan:       Acute   Hypoxia from  COVID-19 pneumonia.  wean O2 keep sats >93%,   Unvaccinated.  On  dexamethasone and baricitinib per pharmacy dosing.  Daily inflammatory markers. IS/self proning as tolerated       Hyponatremia  Mild 134   Hold HCTZ.  Monitor Na       HTN   Hold HCTZ   Continue home medications.  Monitor BP       DM Ty2  Hold metformin.  ISS/lantus monitor BG       Obesity.  Would benefit from weight loss       Mild Abnormal LFT. likely due to COVID.  Monitor closely        ADFC   NOK   DVT Prx lovenox                 Respiratory Failure GRG - Care Day 1 (1/13/2022) by Dorthy Nissen, RN       Review Entered Review Status   1/14/2022 11:49 Completed      Criteria Review      Care Day: 1 Care Date: 1/13/2022 Level of Care: Inpatient Floor    Guideline Day 1    Clinical Status    (X) * Clinical Indications met    1/14/2022 11:49:23 EST by Dorthy Nissen      94% NC 4 l/min    Interventions    (X) Inpatient interventions as needed    1/14/2022 11:49:23 EST by Vessie Brittle, Zayda      respiratory/medication management    * Milestone   Additional Notes   Day 1   1/13/22   In pt medical      Medications   0.9% sodium chloride infusion   Rate: 75 mL/hr Dose: 75 mL/hr   Freq: CONTINUOUS Route: IV      acetaminophen (TYLENOL) tablet 650 mg   Dose: 650 mg   Freq: EVERY 6 HOURS AS NEEDED Route: PO      baricitinib (OLUMIANT) tablet 4 mg   Dose: 4 mg   Freq: DAILY Route: PO      dexAMETHasone (DECADRON) tablet 6 mg   Dose: 6 mg   Freq: DAILY Route: PO      enoxaparin (LOVENOX) injection 40 mg   Dose: 40 mg   Freq: 2 TIMES DAILY Route: SC X 1      insulin lispro (HUMALOG) injection   Freq: 4 TIMES DAILY BEFORE MEALS & NIGHTLY Route: SC X 1 according to sliding scale      sodium chloride 0.9 % bolus infusion 1,000 mL   Dose: 1,000 mL   Freq: ONCE Route: IV      Vitals   100.1;104;160/89;30;94% NC 4 l/min      Labs   EKg   Sinus tachycardia    Otherwise normal ECG      Chest X ray   IMPRESSION   Bilateral pulmonary infiltrates concerning for atypical viral   Pneumonia      COVID-19 rapid test NOTD   Detected Abnormal        RBC 3.92 (L)   HCT 38.7   MCV 98.7   MCH 34.4 (H)   NEUTROPHILS 77 (H)   LYMPHOCYTES 9 (L)   IMMATURE GRANULOCYTES 1 (H)   ABS. IMM. GRANS. 0.1 (H)   ABS.  LYMPHOCYTES 0.5 (L)   Sodium 131 (L)   Anion gap 5   Glucose 142 (H)   Creatinine 0.80   BUN/Creatinine ratio 10 (L)   Bilirubin, total 1.6 (H)   Albumin 2.5 (L)   Globulin 5.0 (H)   A-G Ratio 0.5 (L)    (H)   C-Reactive protein 7.51 (H)      ED   Mr. Neptali Coreas is a 57yo male who presents to the ER with complaints of shortness of breath.  He states that he began to have symptoms approximately 5 to 6 days ago. Liza Jain has progressively felt more fatigued and short of breath.  [facility]  Just he was leaving his house, his wife received results that she tested positive by PCR for [place] has a cough, congestion, and fatigue.  He has had some diarrhea.  No swelling in his legs.  He denies any other complaints. H&P   CHIEF COMPLAINT: Cough and shortness of breath       HISTORY OF PRESENT ILLNESS:      Mr. Jo Turner is a 61 y. [place] male who is admitted with acute respiratory failure with hypoxia.  Mr. Jo Turner with past medical history of DM, HTN, obesity presented to ER complaining of cough and shortness of breath which started about 6 days ago.  The cough is productive associated with shortness of breath.  Denies chest pain.  Had low-grade fever.  Patient is unvaccinated.  His wife is also tested positive for COVID-19. Physical Exam:       Gen:  Well-developed, well-nourished, in no acute distress   HEENT:  Pink conjunctivae, PERRL, hearing intact to voice, moist mucous membranes   Neck:  Supple, without masses, thyroid non-tender   Resp:  No accessory muscle use, clear breath sounds without wheezes rales or rhonchi   Card:  No murmurs, normal S1, S2 without thrills, bruits or peripheral edema   Abd:  Soft, non-tender, non-distended, normoactive bowel sounds are present, no palpable organomegaly and no detectable hernias   Lymph:  No cervical or inguinal adenopathy   Musc:  No cyanosis or clubbing   Skin:  No rashes or ulcers, skin turgor is good   Neuro:  Cranial nerves are grossly intact, no focal motor weakness, follows commands appropriately   Psych:  Good insight, oriented to person, place and time, alert      Assessment/Plan:    1.  Acute respiratory failure with hypoxia ([facility] (1/13/2022) due to COVID-19 pneumonia.  Supplemental oxygen to keep SaO2 greater than 90%       2.  Pneumonia due to COVID-19 virus (1/13/2022).  Unvaccinated.  Start dexamethasone and baricitinib per pharmacy dosing.  Check inflammatory markers.       3.  Hyponatremia (1/13/2022).  Likely secondary to volume depletion.  Hold HCTZ.  Start normal saline IV fluids       4.   HTN (hypertension) (1/13/2022).  Hold HCTZ due to hyponatremia.  Continue home medications       5.  DM (diabetes mellitus) ([address] (1/13/2022).  Hold metformin.  Cover with sliding scale       6.  Obesity.  Would benefit from weight loss       7.  Abnormal LFT. likely due to COVID. Monitor closely                Respiratory Failure GRG - Clinical Indications for Admission to Inpatient Care by Victor Hugo Perez RN       Review Entered Review Status   1/14/2022 11:48 Completed      Criteria Review      Clinical Indications for Admission to Inpatient Care    Most Recent :  Victor Hugo Perez Most Recent Date: 1/14/2022 11:48:58 EST    (X) Hospital admission is needed for appropriate care of the patient because of  1 or more  of    the following  (1) (2) (3) (4) (5) (6) (7) (8) (9) (10):       (X) New (acute) need for mechanical invasive or noninvasive (eg, bilevel positive airway pressure       (BPAP), volume-assured pressure support (VAPS), or volume control) ventilation (11) (12)       (13) (14) (15) (16) (17)       1/14/2022 11:48:58 EST by Victor Hugo Perez         94% NC 4 l/min

## 2022-01-15 LAB
ALBUMIN SERPL-MCNC: 2.3 G/DL (ref 3.5–5)
ALBUMIN/GLOB SERPL: 0.5 {RATIO} (ref 1.1–2.2)
ALP SERPL-CCNC: 83 U/L (ref 45–117)
ALT SERPL-CCNC: 54 U/L (ref 12–78)
ANION GAP SERPL CALC-SCNC: 6 MMOL/L (ref 5–15)
AST SERPL-CCNC: 51 U/L (ref 15–37)
BASOPHILS # BLD: 0 K/UL (ref 0–0.1)
BASOPHILS NFR BLD: 0 % (ref 0–1)
BILIRUB DIRECT SERPL-MCNC: 0.3 MG/DL (ref 0–0.2)
BILIRUB SERPL-MCNC: 0.8 MG/DL (ref 0.2–1)
BUN SERPL-MCNC: 11 MG/DL (ref 6–20)
BUN/CREAT SERPL: 19 (ref 12–20)
CALCIUM SERPL-MCNC: 9.1 MG/DL (ref 8.5–10.1)
CHLORIDE SERPL-SCNC: 106 MMOL/L (ref 97–108)
CO2 SERPL-SCNC: 24 MMOL/L (ref 21–32)
CREAT SERPL-MCNC: 0.58 MG/DL (ref 0.7–1.3)
CRP SERPL-MCNC: 4.95 MG/DL (ref 0–0.6)
D DIMER PPP FEU-MCNC: 1.02 MG/L FEU (ref 0–0.65)
DIFFERENTIAL METHOD BLD: ABNORMAL
EOSINOPHIL # BLD: 0 K/UL (ref 0–0.4)
EOSINOPHIL NFR BLD: 0 % (ref 0–7)
ERYTHROCYTE [DISTWIDTH] IN BLOOD BY AUTOMATED COUNT: 12.1 % (ref 11.5–14.5)
GLOBULIN SER CALC-MCNC: 4.7 G/DL (ref 2–4)
GLUCOSE BLD STRIP.AUTO-MCNC: 170 MG/DL (ref 65–117)
GLUCOSE BLD STRIP.AUTO-MCNC: 184 MG/DL (ref 65–117)
GLUCOSE BLD STRIP.AUTO-MCNC: 190 MG/DL (ref 65–117)
GLUCOSE BLD STRIP.AUTO-MCNC: 276 MG/DL (ref 65–117)
GLUCOSE SERPL-MCNC: 166 MG/DL (ref 65–100)
HCT VFR BLD AUTO: 35.3 % (ref 36.6–50.3)
HGB BLD-MCNC: 12.2 G/DL (ref 12.1–17)
IMM GRANULOCYTES # BLD AUTO: 0 K/UL (ref 0–0.04)
IMM GRANULOCYTES NFR BLD AUTO: 0 % (ref 0–0.5)
LYMPHOCYTES # BLD: 0.5 K/UL (ref 0.8–3.5)
LYMPHOCYTES NFR BLD: 7 % (ref 12–49)
MAGNESIUM SERPL-MCNC: 2 MG/DL (ref 1.6–2.4)
MCH RBC QN AUTO: 33.5 PG (ref 26–34)
MCHC RBC AUTO-ENTMCNC: 34.6 G/DL (ref 30–36.5)
MCV RBC AUTO: 97 FL (ref 80–99)
MONOCYTES # BLD: 0.5 K/UL (ref 0–1)
MONOCYTES NFR BLD: 6 % (ref 5–13)
NEUTS SEG # BLD: 6.2 K/UL (ref 1.8–8)
NEUTS SEG NFR BLD: 86 % (ref 32–75)
NRBC # BLD: 0 K/UL (ref 0–0.01)
NRBC BLD-RTO: 0 PER 100 WBC
PLATELET # BLD AUTO: 229 K/UL (ref 150–400)
PMV BLD AUTO: 9.7 FL (ref 8.9–12.9)
POTASSIUM SERPL-SCNC: 4.2 MMOL/L (ref 3.5–5.1)
PROCALCITONIN SERPL-MCNC: 0.1 NG/ML
PROT SERPL-MCNC: 7 G/DL (ref 6.4–8.2)
RBC # BLD AUTO: 3.64 M/UL (ref 4.1–5.7)
SERVICE CMNT-IMP: ABNORMAL
SODIUM SERPL-SCNC: 136 MMOL/L (ref 136–145)
WBC # BLD AUTO: 7.2 K/UL (ref 4.1–11.1)

## 2022-01-15 PROCEDURE — 36415 COLL VENOUS BLD VENIPUNCTURE: CPT

## 2022-01-15 PROCEDURE — 94761 N-INVAS EAR/PLS OXIMETRY MLT: CPT

## 2022-01-15 PROCEDURE — 65270000029 HC RM PRIVATE

## 2022-01-15 PROCEDURE — 86140 C-REACTIVE PROTEIN: CPT

## 2022-01-15 PROCEDURE — 77010033678 HC OXYGEN DAILY

## 2022-01-15 PROCEDURE — 84145 PROCALCITONIN (PCT): CPT

## 2022-01-15 PROCEDURE — 82962 GLUCOSE BLOOD TEST: CPT

## 2022-01-15 PROCEDURE — 83735 ASSAY OF MAGNESIUM: CPT

## 2022-01-15 PROCEDURE — 74011636637 HC RX REV CODE- 636/637: Performed by: HOSPITALIST

## 2022-01-15 PROCEDURE — 74011250637 HC RX REV CODE- 250/637: Performed by: HOSPITALIST

## 2022-01-15 PROCEDURE — 80048 BASIC METABOLIC PNL TOTAL CA: CPT

## 2022-01-15 PROCEDURE — 85379 FIBRIN DEGRADATION QUANT: CPT

## 2022-01-15 PROCEDURE — 74011250637 HC RX REV CODE- 250/637: Performed by: INTERNAL MEDICINE

## 2022-01-15 PROCEDURE — 85025 COMPLETE CBC W/AUTO DIFF WBC: CPT

## 2022-01-15 PROCEDURE — 74011636637 HC RX REV CODE- 636/637: Performed by: INTERNAL MEDICINE

## 2022-01-15 PROCEDURE — 74011250636 HC RX REV CODE- 250/636: Performed by: INTERNAL MEDICINE

## 2022-01-15 PROCEDURE — 74011000250 HC RX REV CODE- 250: Performed by: INTERNAL MEDICINE

## 2022-01-15 PROCEDURE — 80076 HEPATIC FUNCTION PANEL: CPT

## 2022-01-15 RX ADMIN — INSULIN GLARGINE 10 UNITS: 100 INJECTION, SOLUTION SUBCUTANEOUS at 23:02

## 2022-01-15 RX ADMIN — BARICITINIB 4 MG: 2 TABLET, FILM COATED ORAL at 09:22

## 2022-01-15 RX ADMIN — SODIUM CHLORIDE 75 ML/HR: 9 INJECTION, SOLUTION INTRAVENOUS at 04:40

## 2022-01-15 RX ADMIN — VALSARTAN 160 MG: 80 TABLET, FILM COATED ORAL at 09:22

## 2022-01-15 RX ADMIN — ASPIRIN 81 MG: 81 TABLET, CHEWABLE ORAL at 09:22

## 2022-01-15 RX ADMIN — BENZONATATE 100 MG: 100 CAPSULE ORAL at 17:16

## 2022-01-15 RX ADMIN — ENOXAPARIN SODIUM 40 MG: 100 INJECTION SUBCUTANEOUS at 23:02

## 2022-01-15 RX ADMIN — CLOPIDOGREL BISULFATE 75 MG: 75 TABLET, FILM COATED ORAL at 09:22

## 2022-01-15 RX ADMIN — METOPROLOL SUCCINATE 100 MG: 50 TABLET, EXTENDED RELEASE ORAL at 09:22

## 2022-01-15 RX ADMIN — INSULIN LISPRO 3 UNITS: 100 INJECTION, SOLUTION INTRAVENOUS; SUBCUTANEOUS at 23:03

## 2022-01-15 RX ADMIN — Medication 10 ML: at 23:03

## 2022-01-15 RX ADMIN — ENOXAPARIN SODIUM 40 MG: 100 INJECTION SUBCUTANEOUS at 09:22

## 2022-01-15 RX ADMIN — BENZONATATE 100 MG: 100 CAPSULE ORAL at 09:22

## 2022-01-15 RX ADMIN — Medication 10 ML: at 04:41

## 2022-01-15 RX ADMIN — Medication 500 MG: at 09:22

## 2022-01-15 RX ADMIN — GUAIFENESIN AND DEXTROMETHORPHAN 10 ML: 100; 10 SYRUP ORAL at 13:28

## 2022-01-15 RX ADMIN — BENZONATATE 100 MG: 100 CAPSULE ORAL at 23:01

## 2022-01-15 RX ADMIN — Medication 50 MG: at 09:22

## 2022-01-15 RX ADMIN — Medication 10 ML: at 14:00

## 2022-01-15 RX ADMIN — GUAIFENESIN AND DEXTROMETHORPHAN 10 ML: 100; 10 SYRUP ORAL at 07:32

## 2022-01-15 RX ADMIN — INSULIN LISPRO 2 UNITS: 100 INJECTION, SOLUTION INTRAVENOUS; SUBCUTANEOUS at 09:23

## 2022-01-15 RX ADMIN — ATORVASTATIN CALCIUM 20 MG: 20 TABLET, FILM COATED ORAL at 09:22

## 2022-01-15 RX ADMIN — INSULIN LISPRO 2 UNITS: 100 INJECTION, SOLUTION INTRAVENOUS; SUBCUTANEOUS at 12:55

## 2022-01-15 RX ADMIN — INSULIN LISPRO 2 UNITS: 100 INJECTION, SOLUTION INTRAVENOUS; SUBCUTANEOUS at 17:16

## 2022-01-15 RX ADMIN — DEXAMETHASONE 6 MG: 6 TABLET ORAL at 09:22

## 2022-01-15 NOTE — PROGRESS NOTES
Hospitalist Progress Note    NAME: Jorge L Al   :  1965   MRN:  [id number]     Subjective:   Daily Progress Note: 1/15/2022 11:45 AM      Chief complaint: Admitted with COVID-pneumonia  Patient seen and examined chart was reviewed. Patient on 2 L nasal cannula oxygen, says gets SOB with walking  No other acute issues reported to me by patient or staff at this time.        Current Facility-Administered Medications   Medication Dose Route Frequency    ascorbic acid (vitamin C) (VITAMIN C) tablet 500 mg  500 mg Oral DAILY    aspirin chewable tablet 81 mg  81 mg Oral DAILY    atorvastatin (LIPITOR) tablet 20 mg  20 mg Oral DAILY    clopidogreL (PLAVIX) tablet 75 mg  75 mg Oral DAILY    metoprolol succinate (TOPROL-XL) XL tablet 100 mg  100 mg Oral DAILY    zinc gluconate tablet 50 mg  50 mg Oral DAILY    valsartan (DIOVAN) tablet 160 mg  160 mg Oral DAILY    insulin glargine (LANTUS) injection 10 Units  10 Units SubCUTAneous QHS    benzonatate (TESSALON) capsule 100 mg  100 mg Oral TID    guaiFENesin-dextromethorphan (ROBITUSSIN DM) 100-10 mg/5 mL syrup 10 mL  10 mL Oral Q6H PRN    sodium chloride (NS) flush 5-40 mL  5-40 mL IntraVENous Q8H    sodium chloride (NS) flush 5-40 mL  5-40 mL IntraVENous PRN    acetaminophen (TYLENOL) tablet 650 mg  650 mg Oral Q6H PRN    Or    acetaminophen (TYLENOL) suppository 650 mg  650 mg Rectal Q6H PRN    polyethylene glycol (MIRALAX) packet 17 g  17 g Oral DAILY PRN    ondansetron (ZOFRAN ODT) tablet 4 mg  4 mg Oral Q8H PRN    Or    ondansetron (ZOFRAN) injection 4 mg  4 mg IntraVENous Q6H PRN    insulin lispro (HUMALOG) injection   SubCUTAneous AC&HS    glucose chewable tablet 16 g  4 Tablet Oral PRN    dextrose (D50W) injection syrg 12.5-25 g  12.5-25 g IntraVENous PRN    glucagon (GLUCAGEN) injection 1 mg  1 mg IntraMUSCular PRN    enoxaparin (LOVENOX) injection 40 mg  40 mg SubCUTAneous BID    baricitinib (OLUMIANT) tablet 4 mg  4 mg Oral DAILY    dexAMETHasone (DECADRON) tablet 6 mg  6 mg Oral DAILY          Objective:     Visit Vitals  BP (!) 140/82 (BP 1 Location: Right arm, BP Patient Position: Sitting)   Pulse 66   Temp 98.4 °F (36.9 °C)   Resp 19   Ht 6' 1\" (1.854 m)   Wt 141 kg (310 lb 13.6 oz)   SpO2 97%   BMI 41.01 kg/m²    O2 Flow Rate (L/min): 2 l/min O2 Device: Nasal cannula    Temp (24hrs), Av.1 °F (36.7 °C), Min:97.3 °F (36.3 °C), Max:98.6 °F (37 °C)        PHYSICAL EXAM:  General exam obese  Neck Short  CVS regular rhythm  Respiratory symmetric expansion  Abdomen obese  Extremities nonpitting edema  Neuro AA O x3  Psych normal affect  Skin no visible rash        Data Review    Recent Results (from the past 24 hour(s))   GLUCOSE, POC    Collection Time: 22 10:45 AM   Result Value Ref Range    Glucose (POC) 221 (H) 65 - 117 mg/dL    Performed by Bharat Fernando    FERRITIN    Collection Time: 22 10:56 AM   Result Value Ref Range    Ferritin 1,592 (H) 26 - 388 NG/ML   GLUCOSE, POC    Collection Time: 22  4:12 PM   Result Value Ref Range    Glucose (POC) 159 (H) 65 - 117 mg/dL    Performed by Bharat Fernando    GLUCOSE, POC    Collection Time: 22  9:44 PM   Result Value Ref Range    Glucose (POC) 157 (H) 65 - 117 mg/dL    Performed by [facility] ([facility])    CBC WITH AUTOMATED DIFF    Collection Time: 01/15/22  1:24 AM   Result Value Ref Range    WBC 7.2 4.1 - 11.1 K/uL    RBC 3.64 (L) 4.10 - 5.70 M/uL    HGB 12.2 12.1 - 17.0 g/dL    HCT 35.3 (L) 36.6 - 50.3 %    MCV 97.0 80.0 - 99.0 FL    MCH 33.5 26.0 - 34.0 PG    MCHC 34.6 30.0 - 36.5 g/dL    RDW 12.1 11.5 - 14.5 %    PLATELET 291 827 - 077 K/uL    MPV 9.7 8.9 - 12.9 FL    NRBC 0.0 0  WBC    ABSOLUTE NRBC 0.00 0.00 - 0.01 K/uL    NEUTROPHILS 86 (H) 32 - 75 %    LYMPHOCYTES 7 (L) 12 - 49 %    MONOCYTES 6 5 - 13 %    EOSINOPHILS 0 0 - 7 %    BASOPHILS 0 0 - 1 %    IMMATURE GRANULOCYTES 0 0.0 - 0.5 %    ABS. NEUTROPHILS 6.2 1.8 - 8.0 K/UL    ABS. LYMPHOCYTES 0.5 (L) 0.8 - 3.5 K/UL    ABS. MONOCYTES 0.5 0.0 - 1.0 K/UL    ABS. EOSINOPHILS 0.0 0.0 - 0.4 K/UL    ABS. BASOPHILS 0.0 0.0 - 0.1 K/UL    ABS. IMM. GRANS. 0.0 0.00 - 0.04 K/UL    DF AUTOMATED     METABOLIC PANEL, BASIC    Collection Time: 01/15/22  1:24 AM   Result Value Ref Range    Sodium 136 136 - 145 mmol/L    Potassium 4.2 3.5 - 5.1 mmol/L    Chloride 106 97 - 108 mmol/L    CO2 24 21 - 32 mmol/L    Anion gap 6 5 - 15 mmol/L    Glucose 166 (H) 65 - 100 mg/dL    BUN 11 6 - 20 MG/DL    Creatinine 0.58 (L) 0.70 - 1.30 MG/DL    BUN/Creatinine ratio 19 12 - 20      GFR est AA >60 >60 ml/min/1.73m2    GFR est non-AA >60 >60 ml/min/1.73m2    Calcium 9.1 8.5 - 10.1 MG/DL   MAGNESIUM    Collection Time: 01/15/22  1:24 AM   Result Value Ref Range    Magnesium 2.0 1.6 - 2.4 mg/dL   D DIMER    Collection Time: 01/15/22  1:24 AM   Result Value Ref Range    D-dimer 1.02 (H) 0.00 - 0.65 mg/L FEU   C REACTIVE PROTEIN, QT    Collection Time: 01/15/22  1:24 AM   Result Value Ref Range    C-Reactive protein 4.95 (H) 0.00 - 0.60 mg/dL   HEPATIC FUNCTION PANEL    Collection Time: 01/15/22  1:24 AM   Result Value Ref Range    Protein, total 7.0 6.4 - 8.2 g/dL    Albumin 2.3 (L) 3.5 - 5.0 g/dL    Globulin 4.7 (H) 2.0 - 4.0 g/dL    A-G Ratio 0.5 (L) 1.1 - 2.2      Bilirubin, total 0.8 0.2 - 1.0 MG/DL    Bilirubin, direct 0.3 (H) 0.0 - 0.2 MG/DL    Alk. phosphatase 83 45 - 117 U/L    AST (SGOT) 51 (H) 15 - 37 U/L    ALT (SGPT) 54 12 - 78 U/L   PROCALCITONIN    Collection Time: 01/15/22  1:24 AM   Result Value Ref Range    Procalcitonin 0.10 ng/mL   GLUCOSE, POC    Collection Time: 01/15/22  6:21 AM   Result Value Ref Range    Glucose (POC) 184 (H) 65 - 117 mg/dL    Performed by [id number] ([employer])      No results found for this visit on 01/13/22.   All Micro Results     Procedure Component Value Units Date/Time    COVID-19 RAPID TEST [[id number]]  (Abnormal) Collected: 01/13/22 1207    Order Status: Completed Specimen: Nasopharyngeal Updated: 01/13/22 1235     Specimen source Nasopharyngeal        COVID-19 rapid test Detected        Comment: Rapid Abbott ID Now       The specimen is POSITIVE for SARS-CoV-2, the novel coronavirus associated with COVID-19. This test has been authorized by the FDA under an Emergency Use Authorization (EUA) for use by authorized laboratories. Fact sheet for Healthcare Providers: [employer]  Fact sheet for Patients: [employer]       Methodology: Isothermal Nucleic Acid Amplification  CALLED TO AND READ BACK BY  CHRISTIANO MARTIN AT 1234/LO              CXR IMPRESSION  Bilateral pulmonary infiltrates concerning for atypical viral  pneumonia.          Assessment/Plan:     Acute   Hypoxia from  COVID-19 pneumonia.  wean O2 keep sats >93%,   Unvaccinated. On  dexamethasone and baricitinib per pharmacy dosing. Daily inflammatory markers. IS/self proning as tolerated. Stop IV fluids.     Hyponatremia  Mild 134   Hold HCTZ. Resolved     HTN   Hold HCTZ   Continue home medications. Monitor BP     DM Ty2  Hold metformin. ISS/lantus monitor BG     Obesity. Would benefit from weight loss     Mild Abnormal LFT. likely due to COVID.  Monitor closely     ADFC  NOK  DVT Prx lovenox    Dispo: 48hrs if medically progressing noted    Signed By: Francisco Montalvo MD     January 15, 2022

## 2022-01-15 NOTE — PROGRESS NOTES
Problem: Airway Clearance - Ineffective  Goal: Achieve or maintain patent airway  Outcome: Progressing Towards Goal     Problem: Gas Exchange - Impaired  Goal: Absence of hypoxia  Outcome: Progressing Towards Goal  Goal: Promote optimal lung function  Outcome: Progressing Towards Goal     Problem: Breathing Pattern - Ineffective  Goal: Ability to achieve and maintain a regular respiratory rate  Outcome: Progressing Towards Goal     Problem: Body Temperature -  Risk of, Imbalanced  Goal: Ability to maintain a body temperature within defined limits  Outcome: Progressing Towards Goal  Goal: Will regain or maintain usual level of consciousness  Outcome: Progressing Towards Goal  Goal: Complications related to the disease process, condition or treatment will be avoided or minimized  Outcome: Progressing Towards Goal     Problem: Nutrition Deficits  Goal: Optimize nutrtional status  Outcome: Progressing Towards Goal     Problem: Isolation Precautions - Risk of Spread of Infection  Goal: Prevent transmission of infectious organism to others  Outcome: Progressing Towards Goal     Problem: Risk for Fluid Volume Deficit  Goal: Maintain normal heart rhythm  Outcome: Progressing Towards Goal  Goal: Maintain absence of muscle cramping  Outcome: Progressing Towards Goal  Goal: Maintain normal serum potassium, sodium, calcium, phosphorus, and pH  Outcome: Progressing Towards Goal     Problem: Loneliness or Risk for Loneliness  Goal: Demonstrate positive use of time alone when socialization is not possible  Outcome: Progressing Towards Goal     Problem: Fatigue  Goal: Verbalize increase energy and improved vitality  Outcome: Progressing Towards Goal     Problem: Patient Education: Go to Patient Education Activity  Goal: Patient/Family Education  Outcome: Progressing Towards Goal     Problem: Falls - Risk of  Goal: *Absence of Falls  Description: Document Debbie Fall Risk and appropriate interventions in the flowsheet.   Outcome: Progressing Towards Goal  Note: Fall Risk Interventions:            Medication Interventions: Teach patient to arise slowly                   Problem: Patient Education: Go to Patient Education Activity  Goal: Patient/Family Education  Outcome: Progressing Towards Goal

## 2022-01-16 VITALS
BODY MASS INDEX: 41.2 KG/M2 | SYSTOLIC BLOOD PRESSURE: 137 MMHG | HEART RATE: 70 BPM | DIASTOLIC BLOOD PRESSURE: 77 MMHG | HEIGHT: 73 IN | OXYGEN SATURATION: 94 % | WEIGHT: 310.85 LBS | RESPIRATION RATE: 20 BRPM | TEMPERATURE: 97.8 F

## 2022-01-16 LAB
ANION GAP SERPL CALC-SCNC: 5 MMOL/L (ref 5–15)
BUN SERPL-MCNC: 12 MG/DL (ref 6–20)
BUN/CREAT SERPL: 18 (ref 12–20)
CALCIUM SERPL-MCNC: 9.1 MG/DL (ref 8.5–10.1)
CHLORIDE SERPL-SCNC: 106 MMOL/L (ref 97–108)
CO2 SERPL-SCNC: 27 MMOL/L (ref 21–32)
CREAT SERPL-MCNC: 0.68 MG/DL (ref 0.7–1.3)
CRP SERPL-MCNC: 1.64 MG/DL (ref 0–0.6)
ERYTHROCYTE [DISTWIDTH] IN BLOOD BY AUTOMATED COUNT: 12.3 % (ref 11.5–14.5)
GLUCOSE BLD STRIP.AUTO-MCNC: 172 MG/DL (ref 65–117)
GLUCOSE BLD STRIP.AUTO-MCNC: 178 MG/DL (ref 65–117)
GLUCOSE SERPL-MCNC: 178 MG/DL (ref 65–100)
HCT VFR BLD AUTO: 39.9 % (ref 36.6–50.3)
HGB BLD-MCNC: 13.5 G/DL (ref 12.1–17)
MCH RBC QN AUTO: 33.7 PG (ref 26–34)
MCHC RBC AUTO-ENTMCNC: 33.8 G/DL (ref 30–36.5)
MCV RBC AUTO: 99.5 FL (ref 80–99)
NRBC # BLD: 0 K/UL (ref 0–0.01)
NRBC BLD-RTO: 0 PER 100 WBC
PLATELET # BLD AUTO: 289 K/UL (ref 150–400)
PMV BLD AUTO: 9.9 FL (ref 8.9–12.9)
POTASSIUM SERPL-SCNC: 4.4 MMOL/L (ref 3.5–5.1)
RBC # BLD AUTO: 4.01 M/UL (ref 4.1–5.7)
SERVICE CMNT-IMP: ABNORMAL
SERVICE CMNT-IMP: ABNORMAL
SODIUM SERPL-SCNC: 138 MMOL/L (ref 136–145)
WBC # BLD AUTO: 7.6 K/UL (ref 4.1–11.1)

## 2022-01-16 PROCEDURE — 80048 BASIC METABOLIC PNL TOTAL CA: CPT

## 2022-01-16 PROCEDURE — 85027 COMPLETE CBC AUTOMATED: CPT

## 2022-01-16 PROCEDURE — 82962 GLUCOSE BLOOD TEST: CPT

## 2022-01-16 PROCEDURE — 86140 C-REACTIVE PROTEIN: CPT

## 2022-01-16 PROCEDURE — 94761 N-INVAS EAR/PLS OXIMETRY MLT: CPT

## 2022-01-16 PROCEDURE — 36415 COLL VENOUS BLD VENIPUNCTURE: CPT

## 2022-01-16 PROCEDURE — 74011000250 HC RX REV CODE- 250: Performed by: INTERNAL MEDICINE

## 2022-01-16 PROCEDURE — 77010033678 HC OXYGEN DAILY

## 2022-01-16 PROCEDURE — 74011250637 HC RX REV CODE- 250/637: Performed by: INTERNAL MEDICINE

## 2022-01-16 PROCEDURE — 74011250636 HC RX REV CODE- 250/636: Performed by: INTERNAL MEDICINE

## 2022-01-16 PROCEDURE — 74011250637 HC RX REV CODE- 250/637: Performed by: HOSPITALIST

## 2022-01-16 PROCEDURE — 74011636637 HC RX REV CODE- 636/637: Performed by: INTERNAL MEDICINE

## 2022-01-16 RX ORDER — DEXAMETHASONE 6 MG/1
6 TABLET ORAL
Qty: 7 TABLET | Refills: 0 | Status: SHIPPED | OUTPATIENT
Start: 2022-01-16 | End: 2022-02-28

## 2022-01-16 RX ADMIN — BARICITINIB 4 MG: 2 TABLET, FILM COATED ORAL at 08:11

## 2022-01-16 RX ADMIN — Medication 50 MG: at 08:09

## 2022-01-16 RX ADMIN — DEXAMETHASONE 6 MG: 6 TABLET ORAL at 08:09

## 2022-01-16 RX ADMIN — GUAIFENESIN AND DEXTROMETHORPHAN 10 ML: 100; 10 SYRUP ORAL at 08:22

## 2022-01-16 RX ADMIN — INSULIN LISPRO 2 UNITS: 100 INJECTION, SOLUTION INTRAVENOUS; SUBCUTANEOUS at 08:09

## 2022-01-16 RX ADMIN — ATORVASTATIN CALCIUM 20 MG: 20 TABLET, FILM COATED ORAL at 08:09

## 2022-01-16 RX ADMIN — INSULIN LISPRO 2 UNITS: 100 INJECTION, SOLUTION INTRAVENOUS; SUBCUTANEOUS at 12:56

## 2022-01-16 RX ADMIN — ASPIRIN 81 MG: 81 TABLET, CHEWABLE ORAL at 08:09

## 2022-01-16 RX ADMIN — Medication 500 MG: at 08:11

## 2022-01-16 RX ADMIN — Medication 10 ML: at 06:31

## 2022-01-16 RX ADMIN — CLOPIDOGREL BISULFATE 75 MG: 75 TABLET, FILM COATED ORAL at 08:09

## 2022-01-16 RX ADMIN — BENZONATATE 100 MG: 100 CAPSULE ORAL at 08:09

## 2022-01-16 RX ADMIN — ENOXAPARIN SODIUM 40 MG: 100 INJECTION SUBCUTANEOUS at 08:09

## 2022-01-16 NOTE — DISCHARGE SUMMARY
[facility] [address] SUMMARY    Name:  Declan Song  MR#:  [id number]  :  1965  ACCOUNT #:  [de-identified]  ADMIT DATE:  2022  DISCHARGE DATE:  2022      ADMISSION DIAGNOSIS:  COVID pneumonia. DISCHARGE DIAGNOSES:  COVID pneumonia. HISTORY OF PRESENT ILLNESS:      The patient is a 58-year-old  male who has previous history significant for diabetes, hypertension, presented to the emergency room with cough and shortness of breath which started 6 days prior to admission. He had low-grade fever. The patient was not vaccinated. The patient was admitted for COVID pneumonia. He was started on dexamethasone and baricitinib. His chest x-ray on the 2022 showed bilateral pulmonary infiltrate concerning for atypical viral pneumonia. The patient's inflammatory marker 1.64 CRP today which was 7.51 on admission. He is saturating 98% on 2 liters. He is feeling better at this time. The patient was likely hyponatremic at the time of admission. His HCTZ was held. His overall condition is stable. He will be discharged home. Respiratory therapist did walk test and he dis not require any Oxygen    DISCHARGE MEDICATIONS:      Include the followin. Continue Bumex 1 mg daily as needed. 2.  Vitamin D3 2000 units daily. 3.  Flexeril 10 mg as needed. 4.  Metformin 500 mg twice daily. 5.  Trulicity 5.90 every 7 days. 6.  Vitamin C 500 mg daily. 7.  Aspirin 81 mg daily. 8.  Lipitor 20 mg daily. 9.  Plavix 75 mg daily. 10.  Metoprolol  mg daily. 11.  Zinc sulfate 220 mg daily. 12.  New prescription is Dexamethasone 6 mg daily 7 tablets. CONDITION ON DISCHARGE:  Stable. DISCHARGE INSTRUCTIONS:  Needs to follow up with PCP in next 3-4 days. The patient is being told that if gets more short of breath to come to the emergency room.       Rylee Guzman MD      [id number]  D:  2022 10:09  T:  2022 11:26  JOB #:  [id number]

## 2022-01-16 NOTE — PROGRESS NOTES
01/16/22 1048   Resting (On O2)   SpO2 94   HR 85   O2 Device Nasal cannula   O2 Flow Rate (l/min) 2 l/min   FIO2 (%) 28   During Walk (Room Air)   SpO2 94   HR 84   Walk/Assistance Device Ambulation   Rate of Dyspnea 0   Symptoms Fatigue   During Walk (On O2)   SpO2 94  (no 02 required during walk)   HR 90   Need Additional O2 Flow Rate Rows No   Walk/Assistance Device Ambulation   Rate of Dyspnea 2   Symptoms Fatigue   Comments   (maintained 02 sats > 92% during ambulation/walking)   After Walk   SpO2 93   HR 90   FIO2 (%) 21   Rate of Dyspnea 0   Symptoms Fatigue; Shortness of breath   Does the Patient Qualify for Home O2 No   Does the Patient Need Portable Oxygen Tanks No

## 2022-01-16 NOTE — PROGRESS NOTES
Bedside and Verbal shift change report given to Neena Diaz (oncoming nurse) by Andrea Womack RN (offgoing nurse). Report included the following information SBAR, MAR, Recent Results and Quality Measures.

## 2022-01-16 NOTE — PROGRESS NOTES
Jan 16,22    To whom it may concern    Mr Melinda Subramanian was admitted to [facility] from 1/13/22 to 1/16/22. Please excuse from work till cleared by primary care physician.     Sincerely  Shira Archibald MD

## 2022-01-17 ENCOUNTER — PATIENT OUTREACH (OUTPATIENT)
Dept: CASE MANAGEMENT | Age: 57
End: 2022-01-17

## 2022-01-17 NOTE — PROGRESS NOTES
Transitions of Care Call  Call within 2 business days of discharge: Yes     Patient: Pao Garner Patient : 1965 MRN: [id number]    Last Discharge [address]       Complaint Diagnosis Description Type Department Provider    22 Shortness of Breath Pneumonia due to COVID-19 virus . .. ED to Hosp-Admission (Discharged) (ADMIT) Anna Allred MD; Ángela Interiano . .. Was this an external facility discharge? No     Challenges to be reviewed by the provider   Additional needs identified to be addressed with provider yes  advance care planning- No ACP or HIPAA on EPIC    No A1C on chart       Encounter was not routed to provider for escalation. Method of communication with provider: none. Discussed COVID-19 related testing which was available at this time. Test results were positive. Patient informed of results, if available? yes. Current Symptoms:cough and shortness of breath    Reviewed New or Changed Meds: yes    Do you have what you need at home?  Durable Medical Equipment ordered at discharge: None   Home Health/Outpatient orders at discharge: none   Pulse oximeter? no Patient education provided: Reviewed appropriate site of care based on symptoms and resources available to patient including: PCP. Follow up appointment scheduled within 7 days of discharge: yes. If no appointment scheduled, scheduling offered: yes. No future appointments. PCP Monika Wright 22    Interventions: Scheduled appointment with PCP-NP Monika Wright 22 and Education of patient/family/caregiver/guardian to support self-management-COVID. CTN reviewed discharge instructions, medical action plan and red flags with patient who verbalized understanding. Provided contact information for future needs. Plan for follow-up call in 7-10 days based on severity of symptoms and risk factors.   Plan for next call: self management-Cough-dry, SOB on exertion, has not checked glucose levels and follow up appointment-PCP on 1/20/22     Gerald Mahan RN

## 2022-01-24 ENCOUNTER — PATIENT OUTREACH (OUTPATIENT)
Dept: CASE MANAGEMENT | Age: 57
End: 2022-01-24

## 2022-01-24 NOTE — PROGRESS NOTES
Follow Up Call  Contacted the patient by telephone to follow up after hospital visit. Status: improved  Interventions to address identified needs: Scheduled appointment with PCP-Seen 1/20/22 started on Prenisone, Budesonide and  albuterol nebulizer and  and Assessment and support for treatment adherence and medication management-COVID, [facility] follow up appointment(s): No future appointments. Non-[facility] follow up appointment(s): Seen by PCP GAVI Dunn as scheduled   Follow up appointment completed? yes. Provided contact information for future needs. Plan for follow-up call in 10-14 days based on severity of symptoms and risk factors. Plan for next call: Monitor dry cough, SOB, activity tolerance, finish Prednisone?        Arlyn Gutierrez RN

## 2022-02-03 ENCOUNTER — PATIENT OUTREACH (OUTPATIENT)
Dept: CASE MANAGEMENT | Age: 57
End: 2022-02-03

## 2022-02-03 NOTE — PROGRESS NOTES
[facility]     Jennifer Gonzalez MSN, RN, CCM / Care Transition Nurse / [phone]

## 2022-02-16 ENCOUNTER — PATIENT OUTREACH (OUTPATIENT)
Dept: CASE MANAGEMENT | Age: 57
End: 2022-02-16

## 2022-02-16 NOTE — PROGRESS NOTES
Patient resolved from Transition of Care episode on 2/16/22. CTN was unsuccessful at contacting this patient today. Patient/family was provided the following resources and education related to COVID-19 during the initial call:                         Signs, symptoms and red flags related to COVID-19            CDC exposure and quarantine guidelines             Contact for their local Department of Health                 Patient has not had any additional ED or hospital visits. No further outreach scheduled with this CTN, Episode of Care resolved. Patient has this CTN contact information if future needs arise.

## 2022-02-26 ENCOUNTER — APPOINTMENT (OUTPATIENT)
Dept: CT IMAGING | Age: 57
DRG: 683 | End: 2022-02-26
Attending: EMERGENCY MEDICINE
Payer: COMMERCIAL

## 2022-02-26 ENCOUNTER — HOSPITAL ENCOUNTER (INPATIENT)
Age: 57
LOS: 1 days | Discharge: HOME | DRG: 683 | End: 2022-02-28
Attending: EMERGENCY MEDICINE | Admitting: INTERNAL MEDICINE
Payer: COMMERCIAL

## 2022-02-26 DIAGNOSIS — J18.9 HCAP (HEALTHCARE-ASSOCIATED PNEUMONIA): ICD-10-CM

## 2022-02-26 DIAGNOSIS — H53.483 TUNNEL VISUAL FIELD CONSTRICTION OF BOTH EYES: ICD-10-CM

## 2022-02-26 DIAGNOSIS — E87.20 LACTIC ACIDOSIS: ICD-10-CM

## 2022-02-26 DIAGNOSIS — R42 LIGHTHEADEDNESS: ICD-10-CM

## 2022-02-26 DIAGNOSIS — R42 DIZZINESS: ICD-10-CM

## 2022-02-26 DIAGNOSIS — E87.1 HYPONATREMIA: ICD-10-CM

## 2022-02-26 DIAGNOSIS — A41.9 SEPSIS, DUE TO UNSPECIFIED ORGANISM, UNSPECIFIED WHETHER ACUTE ORGAN DYSFUNCTION PRESENT (HCC): Primary | ICD-10-CM

## 2022-02-26 DIAGNOSIS — N17.9 ACUTE KIDNEY INJURY (HCC): ICD-10-CM

## 2022-02-26 PROCEDURE — 99285 EMERGENCY DEPT VISIT HI MDM: CPT

## 2022-02-26 PROCEDURE — 70450 CT HEAD/BRAIN W/O DYE: CPT

## 2022-02-26 PROCEDURE — 93005 ELECTROCARDIOGRAM TRACING: CPT

## 2022-02-26 PROCEDURE — 94762 N-INVAS EAR/PLS OXIMTRY CONT: CPT

## 2022-02-26 NOTE — Clinical Note
Patient Class[de-identified] OBSERVATION [104]   Type of Bed: Remote Telemetry [29]   Cardiac Monitoring Required?: No   Reason for Observation: lactic acidosis; worsening infiltrate in patient with COVID-19 last month   Admitting Diagnosis: Lactic acidosis [197366]   Admitting Physician: Michael Arrington [[id number]]   Attending Physician: Michael Arrington [[id number]]

## 2022-02-27 ENCOUNTER — APPOINTMENT (OUTPATIENT)
Dept: CT IMAGING | Age: 57
DRG: 683 | End: 2022-02-27
Attending: EMERGENCY MEDICINE
Payer: COMMERCIAL

## 2022-02-27 ENCOUNTER — APPOINTMENT (OUTPATIENT)
Dept: NON INVASIVE DIAGNOSTICS | Age: 57
DRG: 683 | End: 2022-02-27
Attending: INTERNAL MEDICINE
Payer: COMMERCIAL

## 2022-02-27 ENCOUNTER — APPOINTMENT (OUTPATIENT)
Dept: GENERAL RADIOLOGY | Age: 57
DRG: 683 | End: 2022-02-27
Attending: EMERGENCY MEDICINE
Payer: COMMERCIAL

## 2022-02-27 ENCOUNTER — APPOINTMENT (OUTPATIENT)
Dept: MRI IMAGING | Age: 57
DRG: 683 | End: 2022-02-27
Attending: INTERNAL MEDICINE
Payer: COMMERCIAL

## 2022-02-27 PROBLEM — U07.1 COVID-19: Status: ACTIVE | Noted: 2022-02-27

## 2022-02-27 PROBLEM — E87.20 LACTIC ACIDOSIS: Status: ACTIVE | Noted: 2022-02-27

## 2022-02-27 LAB
ALBUMIN SERPL-MCNC: 2.9 G/DL (ref 3.5–5)
ALBUMIN/GLOB SERPL: 0.7 {RATIO} (ref 1.1–2.2)
ALP SERPL-CCNC: 83 U/L (ref 45–117)
ALT SERPL-CCNC: 97 U/L (ref 12–78)
ANION GAP SERPL CALC-SCNC: 14 MMOL/L (ref 5–15)
AST SERPL-CCNC: 64 U/L (ref 15–37)
ATRIAL RATE: 110 BPM
BASOPHILS # BLD: 0 K/UL (ref 0–0.1)
BASOPHILS NFR BLD: 0 % (ref 0–1)
BILIRUB SERPL-MCNC: 1.8 MG/DL (ref 0.2–1)
BNP SERPL-MCNC: 201 PG/ML
BUN SERPL-MCNC: 10 MG/DL (ref 6–20)
BUN/CREAT SERPL: 7 (ref 12–20)
CALCIUM SERPL-MCNC: 8.9 MG/DL (ref 8.5–10.1)
CALCULATED P AXIS, ECG09: 71 DEGREES
CALCULATED R AXIS, ECG10: 23 DEGREES
CALCULATED T AXIS, ECG11: 61 DEGREES
CHLORIDE SERPL-SCNC: 92 MMOL/L (ref 97–108)
CO2 SERPL-SCNC: 23 MMOL/L (ref 21–32)
COMMENT, HOLDF: NORMAL
COVID-19 RAPID TEST, COVR: NOT DETECTED
CREAT SERPL-MCNC: 1.43 MG/DL (ref 0.7–1.3)
D DIMER PPP FEU-MCNC: 1.17 MG/L FEU (ref 0–0.65)
DIAGNOSIS, 93000: NORMAL
DIFFERENTIAL METHOD BLD: ABNORMAL
EOSINOPHIL # BLD: 0 K/UL (ref 0–0.4)
EOSINOPHIL NFR BLD: 0 % (ref 0–7)
ERYTHROCYTE [DISTWIDTH] IN BLOOD BY AUTOMATED COUNT: 13.2 % (ref 11.5–14.5)
FERRITIN SERPL-MCNC: 1889 NG/ML (ref 26–388)
GLOBULIN SER CALC-MCNC: 3.9 G/DL (ref 2–4)
GLUCOSE BLD STRIP.AUTO-MCNC: 140 MG/DL (ref 65–117)
GLUCOSE BLD STRIP.AUTO-MCNC: 182 MG/DL (ref 65–117)
GLUCOSE BLD STRIP.AUTO-MCNC: 198 MG/DL (ref 65–117)
GLUCOSE BLD STRIP.AUTO-MCNC: 216 MG/DL (ref 65–117)
GLUCOSE BLD STRIP.AUTO-MCNC: 251 MG/DL (ref 65–117)
GLUCOSE SERPL-MCNC: 195 MG/DL (ref 65–100)
HCT VFR BLD AUTO: 36 % (ref 36.6–50.3)
HGB BLD-MCNC: 12.7 G/DL (ref 12.1–17)
IMM GRANULOCYTES # BLD AUTO: 0.1 K/UL (ref 0–0.04)
IMM GRANULOCYTES NFR BLD AUTO: 1 % (ref 0–0.5)
INR PPP: 1.2 (ref 0.9–1.1)
LACTATE SERPL-SCNC: 1.2 MMOL/L (ref 0.4–2)
LACTATE SERPL-SCNC: 2.6 MMOL/L (ref 0.4–2)
LACTATE SERPL-SCNC: 5.4 MMOL/L (ref 0.4–2)
LDH SERPL L TO P-CCNC: 320 U/L (ref 85–241)
LYMPHOCYTES # BLD: 2.1 K/UL (ref 0.8–3.5)
LYMPHOCYTES NFR BLD: 19 % (ref 12–49)
MCH RBC QN AUTO: 33.4 PG (ref 26–34)
MCHC RBC AUTO-ENTMCNC: 35.3 G/DL (ref 30–36.5)
MCV RBC AUTO: 94.7 FL (ref 80–99)
MONOCYTES # BLD: 0.5 K/UL (ref 0–1)
MONOCYTES NFR BLD: 4 % (ref 5–13)
NEUTS SEG # BLD: 8.4 K/UL (ref 1.8–8)
NEUTS SEG NFR BLD: 76 % (ref 32–75)
NRBC # BLD: 0 K/UL (ref 0–0.01)
NRBC BLD-RTO: 0 PER 100 WBC
P-R INTERVAL, ECG05: 142 MS
PLATELET # BLD AUTO: 205 K/UL (ref 150–400)
PMV BLD AUTO: 9.6 FL (ref 8.9–12.9)
POTASSIUM SERPL-SCNC: 3.7 MMOL/L (ref 3.5–5.1)
PROCALCITONIN SERPL-MCNC: 0.21 NG/ML
PROT SERPL-MCNC: 6.8 G/DL (ref 6.4–8.2)
PROTHROMBIN TIME: 11.9 SEC (ref 9–11.1)
Q-T INTERVAL, ECG07: 342 MS
QRS DURATION, ECG06: 82 MS
QTC CALCULATION (BEZET), ECG08: 462 MS
RBC # BLD AUTO: 3.8 M/UL (ref 4.1–5.7)
SAMPLES BEING HELD,HOLD: NORMAL
SERVICE CMNT-IMP: ABNORMAL
SODIUM SERPL-SCNC: 129 MMOL/L (ref 136–145)
SOURCE, COVRS: NORMAL
TROPONIN-HIGH SENSITIVITY: 7 NG/L (ref 0–76)
VENTRICULAR RATE, ECG03: 110 BPM
WBC # BLD AUTO: 11.1 K/UL (ref 4.1–11.1)

## 2022-02-27 PROCEDURE — 74011636637 HC RX REV CODE- 636/637: Performed by: INTERNAL MEDICINE

## 2022-02-27 PROCEDURE — 85025 COMPLETE CBC W/AUTO DIFF WBC: CPT

## 2022-02-27 PROCEDURE — 82728 ASSAY OF FERRITIN: CPT

## 2022-02-27 PROCEDURE — 82962 GLUCOSE BLOOD TEST: CPT

## 2022-02-27 PROCEDURE — 36415 COLL VENOUS BLD VENIPUNCTURE: CPT

## 2022-02-27 PROCEDURE — 74011000250 HC RX REV CODE- 250: Performed by: INTERNAL MEDICINE

## 2022-02-27 PROCEDURE — 74011250637 HC RX REV CODE- 250/637: Performed by: INTERNAL MEDICINE

## 2022-02-27 PROCEDURE — 74011250636 HC RX REV CODE- 250/636: Performed by: INTERNAL MEDICINE

## 2022-02-27 PROCEDURE — 70496 CT ANGIOGRAPHY HEAD: CPT

## 2022-02-27 PROCEDURE — 94640 AIRWAY INHALATION TREATMENT: CPT

## 2022-02-27 PROCEDURE — 83605 ASSAY OF LACTIC ACID: CPT

## 2022-02-27 PROCEDURE — 83880 ASSAY OF NATRIURETIC PEPTIDE: CPT

## 2022-02-27 PROCEDURE — 74011000636 HC RX REV CODE- 636: Performed by: RADIOLOGY

## 2022-02-27 PROCEDURE — U0005 INFEC AGEN DETEC AMPLI PROBE: HCPCS

## 2022-02-27 PROCEDURE — 85610 PROTHROMBIN TIME: CPT

## 2022-02-27 PROCEDURE — 84484 ASSAY OF TROPONIN QUANT: CPT

## 2022-02-27 PROCEDURE — 87040 BLOOD CULTURE FOR BACTERIA: CPT

## 2022-02-27 PROCEDURE — 83615 LACTATE (LD) (LDH) ENZYME: CPT

## 2022-02-27 PROCEDURE — 87635 SARS-COV-2 COVID-19 AMP PRB: CPT

## 2022-02-27 PROCEDURE — G0378 HOSPITAL OBSERVATION PER HR: HCPCS

## 2022-02-27 PROCEDURE — XW0DXM6 INTRODUCTION OF BARICITINIB INTO MOUTH AND PHARYNX, EXTERNAL APPROACH, NEW TECHNOLOGY GROUP 6: ICD-10-PCS | Performed by: INTERNAL MEDICINE

## 2022-02-27 PROCEDURE — 74011250636 HC RX REV CODE- 250/636: Performed by: EMERGENCY MEDICINE

## 2022-02-27 PROCEDURE — 71045 X-RAY EXAM CHEST 1 VIEW: CPT

## 2022-02-27 PROCEDURE — 70551 MRI BRAIN STEM W/O DYE: CPT

## 2022-02-27 PROCEDURE — 74011000258 HC RX REV CODE- 258: Performed by: EMERGENCY MEDICINE

## 2022-02-27 PROCEDURE — 96376 TX/PRO/DX INJ SAME DRUG ADON: CPT

## 2022-02-27 PROCEDURE — 96366 THER/PROPH/DIAG IV INF ADDON: CPT

## 2022-02-27 PROCEDURE — 85379 FIBRIN DEGRADATION QUANT: CPT

## 2022-02-27 PROCEDURE — 96365 THER/PROPH/DIAG IV INF INIT: CPT

## 2022-02-27 PROCEDURE — 65660000000 HC RM CCU STEPDOWN

## 2022-02-27 PROCEDURE — 80053 COMPREHEN METABOLIC PANEL: CPT

## 2022-02-27 PROCEDURE — 4A03X5D MEASUREMENT OF ARTERIAL FLOW, INTRACRANIAL, EXTERNAL APPROACH: ICD-10-PCS | Performed by: RADIOLOGY

## 2022-02-27 PROCEDURE — 84145 PROCALCITONIN (PCT): CPT

## 2022-02-27 PROCEDURE — 99223 1ST HOSP IP/OBS HIGH 75: CPT | Performed by: PSYCHIATRY & NEUROLOGY

## 2022-02-27 PROCEDURE — 0042T CT CODE NEURO PERF W CBF: CPT

## 2022-02-27 RX ORDER — ACETAMINOPHEN 325 MG/1
650 TABLET ORAL
Status: DISCONTINUED | OUTPATIENT
Start: 2022-02-27 | End: 2022-02-28 | Stop reason: HOSPADM

## 2022-02-27 RX ORDER — ONDANSETRON 4 MG/1
4 TABLET, ORALLY DISINTEGRATING ORAL
Status: DISCONTINUED | OUTPATIENT
Start: 2022-02-27 | End: 2022-02-28 | Stop reason: HOSPADM

## 2022-02-27 RX ORDER — DEXAMETHASONE 6 MG/1
6 TABLET ORAL
Status: DISCONTINUED | OUTPATIENT
Start: 2022-02-27 | End: 2022-02-28 | Stop reason: HOSPADM

## 2022-02-27 RX ORDER — ENOXAPARIN SODIUM 100 MG/ML
40 INJECTION SUBCUTANEOUS DAILY
Status: DISCONTINUED | OUTPATIENT
Start: 2022-02-27 | End: 2022-02-27

## 2022-02-27 RX ORDER — ARFORMOTEROL TARTRATE 15 UG/2ML
15 SOLUTION RESPIRATORY (INHALATION)
Status: DISCONTINUED | OUTPATIENT
Start: 2022-02-27 | End: 2022-02-28 | Stop reason: HOSPADM

## 2022-02-27 RX ORDER — ENOXAPARIN SODIUM 100 MG/ML
30 INJECTION SUBCUTANEOUS EVERY 12 HOURS
Status: DISCONTINUED | OUTPATIENT
Start: 2022-02-27 | End: 2022-02-27

## 2022-02-27 RX ORDER — SODIUM CHLORIDE 9 MG/ML
100 INJECTION, SOLUTION INTRAVENOUS CONTINUOUS
Status: DISCONTINUED | OUTPATIENT
Start: 2022-02-27 | End: 2022-02-27

## 2022-02-27 RX ORDER — ASCORBIC ACID 500 MG
500 TABLET ORAL DAILY
Status: DISCONTINUED | OUTPATIENT
Start: 2022-02-27 | End: 2022-02-28 | Stop reason: HOSPADM

## 2022-02-27 RX ORDER — ATORVASTATIN CALCIUM 20 MG/1
20 TABLET, FILM COATED ORAL DAILY
Status: DISCONTINUED | OUTPATIENT
Start: 2022-02-27 | End: 2022-02-28 | Stop reason: HOSPADM

## 2022-02-27 RX ORDER — LOSARTAN POTASSIUM 50 MG/1
100 TABLET ORAL DAILY
Status: DISCONTINUED | OUTPATIENT
Start: 2022-02-27 | End: 2022-02-27

## 2022-02-27 RX ORDER — SODIUM CHLORIDE 0.9 % (FLUSH) 0.9 %
5-10 SYRINGE (ML) INJECTION AS NEEDED
Status: DISCONTINUED | OUTPATIENT
Start: 2022-02-27 | End: 2022-02-28 | Stop reason: HOSPADM

## 2022-02-27 RX ORDER — CLOPIDOGREL BISULFATE 75 MG/1
75 TABLET ORAL DAILY
Status: DISCONTINUED | OUTPATIENT
Start: 2022-02-27 | End: 2022-02-28 | Stop reason: HOSPADM

## 2022-02-27 RX ORDER — ACETAMINOPHEN 650 MG/1
650 SUPPOSITORY RECTAL
Status: DISCONTINUED | OUTPATIENT
Start: 2022-02-27 | End: 2022-02-28 | Stop reason: HOSPADM

## 2022-02-27 RX ORDER — GUAIFENESIN 100 MG/5ML
81 LIQUID (ML) ORAL DAILY
Status: DISCONTINUED | OUTPATIENT
Start: 2022-02-27 | End: 2022-02-28 | Stop reason: HOSPADM

## 2022-02-27 RX ORDER — MAGNESIUM SULFATE 100 %
4 CRYSTALS MISCELLANEOUS AS NEEDED
Status: DISCONTINUED | OUTPATIENT
Start: 2022-02-27 | End: 2022-02-28 | Stop reason: HOSPADM

## 2022-02-27 RX ORDER — BUDESONIDE 0.5 MG/2ML
500 INHALANT ORAL
Status: DISCONTINUED | OUTPATIENT
Start: 2022-02-27 | End: 2022-02-28 | Stop reason: HOSPADM

## 2022-02-27 RX ORDER — POLYETHYLENE GLYCOL 3350 17 G/17G
17 POWDER, FOR SOLUTION ORAL DAILY PRN
Status: DISCONTINUED | OUTPATIENT
Start: 2022-02-27 | End: 2022-02-28 | Stop reason: HOSPADM

## 2022-02-27 RX ORDER — DEXTROSE MONOHYDRATE 100 MG/ML
0-250 INJECTION, SOLUTION INTRAVENOUS AS NEEDED
Status: DISCONTINUED | OUTPATIENT
Start: 2022-02-27 | End: 2022-02-28 | Stop reason: HOSPADM

## 2022-02-27 RX ORDER — VANCOMYCIN 2 GRAM/500 ML IN 0.9 % SODIUM CHLORIDE INTRAVENOUS
2 ONCE
Status: DISCONTINUED | OUTPATIENT
Start: 2022-02-27 | End: 2022-02-27

## 2022-02-27 RX ORDER — MELATONIN
2000 DAILY
Status: DISCONTINUED | OUTPATIENT
Start: 2022-02-27 | End: 2022-02-28 | Stop reason: HOSPADM

## 2022-02-27 RX ORDER — ONDANSETRON 2 MG/ML
4 INJECTION INTRAMUSCULAR; INTRAVENOUS
Status: DISCONTINUED | OUTPATIENT
Start: 2022-02-27 | End: 2022-02-28 | Stop reason: HOSPADM

## 2022-02-27 RX ORDER — THERA TABS 400 MCG
1 TAB ORAL DAILY
Status: DISCONTINUED | OUTPATIENT
Start: 2022-02-27 | End: 2022-02-28 | Stop reason: HOSPADM

## 2022-02-27 RX ORDER — ENOXAPARIN SODIUM 100 MG/ML
30 INJECTION SUBCUTANEOUS EVERY 12 HOURS
Status: DISCONTINUED | OUTPATIENT
Start: 2022-02-27 | End: 2022-02-28 | Stop reason: HOSPADM

## 2022-02-27 RX ORDER — SODIUM CHLORIDE 0.9 % (FLUSH) 0.9 %
5-40 SYRINGE (ML) INJECTION EVERY 8 HOURS
Status: DISCONTINUED | OUTPATIENT
Start: 2022-02-27 | End: 2022-02-28 | Stop reason: HOSPADM

## 2022-02-27 RX ORDER — INSULIN LISPRO 100 [IU]/ML
INJECTION, SOLUTION INTRAVENOUS; SUBCUTANEOUS
Status: DISCONTINUED | OUTPATIENT
Start: 2022-02-27 | End: 2022-02-28 | Stop reason: HOSPADM

## 2022-02-27 RX ORDER — ZINC SULFATE 50(220)MG
1 CAPSULE ORAL DAILY
Status: DISCONTINUED | OUTPATIENT
Start: 2022-02-27 | End: 2022-02-28 | Stop reason: HOSPADM

## 2022-02-27 RX ORDER — METOPROLOL SUCCINATE 25 MG/1
25 TABLET, EXTENDED RELEASE ORAL DAILY
Status: DISCONTINUED | OUTPATIENT
Start: 2022-02-27 | End: 2022-02-28 | Stop reason: HOSPADM

## 2022-02-27 RX ORDER — SODIUM CHLORIDE 9 MG/ML
125 INJECTION, SOLUTION INTRAVENOUS CONTINUOUS
Status: DISCONTINUED | OUTPATIENT
Start: 2022-02-27 | End: 2022-02-28 | Stop reason: HOSPADM

## 2022-02-27 RX ORDER — SODIUM CHLORIDE 0.9 % (FLUSH) 0.9 %
5-40 SYRINGE (ML) INJECTION AS NEEDED
Status: DISCONTINUED | OUTPATIENT
Start: 2022-02-27 | End: 2022-02-28 | Stop reason: HOSPADM

## 2022-02-27 RX ADMIN — CLOPIDOGREL BISULFATE 75 MG: 75 TABLET ORAL at 09:09

## 2022-02-27 RX ADMIN — VANCOMYCIN HYDROCHLORIDE 2500 MG: 10 INJECTION, POWDER, LYOPHILIZED, FOR SOLUTION INTRAVENOUS at 01:48

## 2022-02-27 RX ADMIN — Medication 2 UNITS: at 22:57

## 2022-02-27 RX ADMIN — SODIUM CHLORIDE 1000 ML: 9 INJECTION, SOLUTION INTRAVENOUS at 01:02

## 2022-02-27 RX ADMIN — IOPAMIDOL 100 ML: 755 INJECTION, SOLUTION INTRAVENOUS at 00:29

## 2022-02-27 RX ADMIN — DEXAMETHASONE 6 MG: 6 TABLET ORAL at 07:57

## 2022-02-27 RX ADMIN — SODIUM CHLORIDE, PRESERVATIVE FREE 10 ML: 5 INJECTION INTRAVENOUS at 17:06

## 2022-02-27 RX ADMIN — PIPERACILLIN AND TAZOBACTAM 3.38 G: 3; .375 INJECTION, POWDER, LYOPHILIZED, FOR SOLUTION INTRAVENOUS at 01:30

## 2022-02-27 RX ADMIN — Medication 2000 UNITS: at 09:09

## 2022-02-27 RX ADMIN — SODIUM CHLORIDE 125 ML/HR: 9 INJECTION, SOLUTION INTRAVENOUS at 18:33

## 2022-02-27 RX ADMIN — SODIUM CHLORIDE, PRESERVATIVE FREE 10 ML: 5 INJECTION INTRAVENOUS at 22:58

## 2022-02-27 RX ADMIN — ATORVASTATIN CALCIUM 20 MG: 20 TABLET, FILM COATED ORAL at 09:09

## 2022-02-27 RX ADMIN — IOPAMIDOL 40 ML: 755 INJECTION, SOLUTION INTRAVENOUS at 00:29

## 2022-02-27 RX ADMIN — ENOXAPARIN SODIUM 30 MG: 100 INJECTION SUBCUTANEOUS at 09:11

## 2022-02-27 RX ADMIN — SODIUM CHLORIDE 1000 ML: 9 INJECTION, SOLUTION INTRAVENOUS at 04:12

## 2022-02-27 RX ADMIN — SODIUM CHLORIDE 100 ML/HR: 9 INJECTION, SOLUTION INTRAVENOUS at 11:36

## 2022-02-27 RX ADMIN — Medication 2 UNITS: at 11:35

## 2022-02-27 RX ADMIN — THERA TABS 1 TABLET: TAB at 09:09

## 2022-02-27 RX ADMIN — OXYCODONE HYDROCHLORIDE AND ACETAMINOPHEN 500 MG: 500 TABLET ORAL at 09:09

## 2022-02-27 RX ADMIN — Medication 2 UNITS: at 07:57

## 2022-02-27 RX ADMIN — ZINC SULFATE 220 MG (50 MG) CAPSULE 1 CAPSULE: CAPSULE at 09:09

## 2022-02-27 RX ADMIN — PIPERACILLIN AND TAZOBACTAM 3.38 G: 3; .375 INJECTION, POWDER, LYOPHILIZED, FOR SOLUTION INTRAVENOUS at 07:58

## 2022-02-27 RX ADMIN — ENOXAPARIN SODIUM 30 MG: 100 INJECTION SUBCUTANEOUS at 03:28

## 2022-02-27 RX ADMIN — METOPROLOL SUCCINATE 25 MG: 25 TABLET, EXTENDED RELEASE ORAL at 13:08

## 2022-02-27 RX ADMIN — SODIUM CHLORIDE 1000 ML: 9 INJECTION, SOLUTION INTRAVENOUS at 05:34

## 2022-02-27 RX ADMIN — BARICITINIB 4 MG: 2 TABLET, FILM COATED ORAL at 17:11

## 2022-02-27 RX ADMIN — Medication 5 UNITS: at 16:27

## 2022-02-27 RX ADMIN — ASPIRIN 81 MG: 81 TABLET, CHEWABLE ORAL at 09:08

## 2022-02-27 RX ADMIN — ARFORMOTEROL TARTRATE 15 MCG: 15 SOLUTION RESPIRATORY (INHALATION) at 11:09

## 2022-02-27 RX ADMIN — ENOXAPARIN SODIUM 30 MG: 100 INJECTION SUBCUTANEOUS at 22:57

## 2022-02-27 RX ADMIN — BUDESONIDE 500 MCG: 0.5 SUSPENSION RESPIRATORY (INHALATION) at 11:10

## 2022-02-27 NOTE — ED NOTES
Verbal shift change report given to Isis RN (oncoming nurse) by Carle Barthel, RN (offgoing nurse). Report included the following information SBAR, Kardex, ED Summary, Intake/Output, MAR and Recent Results.

## 2022-02-27 NOTE — CONSULTS
Neurology Consult - Inpatient      Name:   Yudy Leon  MRN:    [id number]    Date of Admission:  2/26/2022    Date of Consultation:  02/27/22       HISTORY OF PRESENT ILLNESS:     This is a 61 y.o. male with PMHx DM, HTN, Prostate CA (s/p brachytherapy), Covid-19 positive (tested positive 1-13-22), CAD with drug-eluting stent (mid-LAD, March 2021). Admitting diagnoses: HARDY, Dizziness with visual changes, lactic acidosis, CAD,recent covid infection, IDDM, Hx wheezing/ suspect COPD, Obesity. Neurology is asked to see the patient for Dizziness, Visual Changes. Pt awake, alert, resting in bed. He explains that starting sometime mid-last week, he began to feel dizzy, like he was \"on a boat,\" slightly spinning, if he changed positions. It would last briefly or up to 5 minutes. He also noted that at times his vision (both sides) seems like it would be gradually darkening from the peripherally/ tunnel vision like. He started checking his BP and noted having low BPs (80-90s/ 50s-60s). Was also having increasing shortness of breath. Says before having Covid infection in mid-January, his BPs were nromally 130s-140s/ 80s-90s; on BP meds). D/w him that Brain MRI looks normal, no evidence of recent or remote stroke. Oxygen levels have been low since admission, 91-93% on RA, requiring nasal cannula due to dyspnea. Reports having covid-related pneumonia in January. CT Brain Perfusion: Normal CT perfusion. CTA Head / Neck: IMPRESSION: 1. No large vessel occlusion, perfusion abnormality, or hemodynamically significant carotid stenosis. 2.  Ground glass opacities and interlobular septal thickening in the upper lobes of the lungs bilaterally could be reflective of edema or atypical viral infection.      Pertinent Home Meds: Lipitor 20 mg QHS, Plavix 75 mg/ day       Complete Review of Systems: reviewed on admission H&P    ====================================     Allergies   Allergen Reactions    Shellfish Derived Nausea Only       Current Facility-Administered Medications   Medication Dose Route Frequency Provider Last Rate Last Admin    sodium chloride (NS) flush 5-10 mL  5-10 mL IntraVENous PRN Guillermo Lord MD        sodium chloride (NS) flush 5-40 mL  5-40 mL IntraVENous Q8H Adams Misty H, DO        sodium chloride (NS) flush 5-40 mL  5-40 mL IntraVENous PRN Adams Misty H, DO        acetaminophen (TYLENOL) tablet 650 mg  650 mg Oral Q6H PRN Adams Misty H, DO        Or    acetaminophen (TYLENOL) suppository 650 mg  650 mg Rectal Q6H PRN Adams Misty H, DO        polyethylene glycol (MIRALAX) packet 17 g  17 g Oral DAILY PRN Adams Misty H, DO        ondansetron (ZOFRAN ODT) tablet 4 mg  4 mg Oral Q8H PRN Adams Misty H, DO        Or    [facility] injection 4 mg  4 mg IntraVENous Q6H PRN Adams Misty H, DO        sodium chloride (NS) flush 5-10 mL  5-10 mL IntraVENous PRN Adams Misty H, DO        enoxaparin (LOVENOX) injection 30 mg  30 mg SubCUTAneous Q12H Adams Finlay H, DO   30 mg at 02/27/22 0911    ondansetron (ZOFRAN) injection 4 mg  4 mg IntraVENous Q6H PRN Arva Gutter, DO        aspirin chewable tablet 81 mg  81 mg Oral DAILY Adams Finlay H, DO   81 mg at 02/27/22 5421    atorvastatin (LIPITOR) tablet 20 mg  20 mg Oral DAILY Adams Finlay H, DO   20 mg at 02/27/22 7506    cholecalciferol (VITAMIN D3) (1000 Units /25 mcg) tablet 2,000 Units  2,000 Units Oral DAILY Arva Gutter, DO   2,000 Units at 02/27/22 1443    clopidogreL (PLAVIX) tablet 75 mg  75 mg Oral DAILY Adams Finlay H, DO   75 mg at 02/27/22 4023    metoprolol succinate (TOPROL-XL) XL tablet 25 mg  25 mg Oral DAILY Adams Finlay H, DO   25 mg at 02/27/22 1308    therapeutic multivitamin [facility]) tablet 1 Tablet  1 Tablet Oral DAILY Adams Misty H, DO   1 Tablet at 02/27/22 4406    zinc sulfate (ZINCATE) 50 mg zinc (220 mg) capsule 1 Capsule  1 Capsule Oral DAILY Efrain SAHU DO   1 Capsule at 02/27/22 0909    dexAMETHasone (DECADRON) tablet 6 mg  6 mg Oral ACB Cheryle Malu H, DO   6 mg at 02/27/22 6159    ascorbic acid (vitamin C) (VITAMIN C) tablet 500 mg  500 mg Oral DAILY Cheryle Malu H, DO   500 mg at 02/27/22 4853    insulin lispro (HUMALOG) injection   SubCUTAneous AC&HS Ani Gaytan, DO   2 Units at 02/27/22 1135    glucose chewable tablet 16 g  4 Tablet Oral PRN Ani Gaytan, DO        glucagon [facility]) injection 1 mg  1 mg IntraMUSCular PRN Cheryle Malu H, DO        dextrose 10% infusion 0-250 mL  0-250 mL IntraVENous PRN Ani Gaytan, DO        arformoteroL (BROVANA) neb solution 15 mcg  15 mcg Nebulization BID RT Cheryle Malu H, DO   15 mcg at 02/27/22 1109    budesonide (PULMICORT) 500 mcg/2 ml nebulizer suspension  500 mcg Nebulization BID RT Cheryle Malu H, DO   500 mcg at 02/27/22 1110     Current Outpatient Medications   Medication Sig Dispense Refill    dexAMETHasone (DECADRON) 6 mg tablet Take 1 Tablet by mouth Daily (before breakfast). 7 Tablet 0    clopidogreL (PLAVIX) 75 mg tab Take 75 mg by mouth daily.  olmesartan (BENICAR) 40 mg tablet Take 40 mg by mouth daily.  bumetanide (BUMEX) 1 mg tablet Take 1 mg by mouth daily as needed. Indications: visible water retention      ascorbic acid, vitamin C, (VITAMIN C) 500 mg tablet Take 500 mg by mouth daily.  cholecalciferol, vitamin D3, (Vitamin D3) 50 mcg (2,000 unit) tab Take 2,000 Units by mouth daily.  zinc sulfate (ZINC-220 PO) Take 1 Caplet by mouth daily.  multivitamin (ONE A DAY) tablet Take 1 Tablet by mouth daily.  diclofenac EC (VOLTAREN) 50 mg EC tablet Take 50 mg by mouth four (4) times daily as needed for Pain.  Trulicity 2.25 PE/4.1 mL sub-q pen 0.75 mg by SubCUTAneous route every seven (7) days.  metFORMIN (GLUCOPHAGE) 500 mg tablet Take 500 mg by mouth two (2) times daily (with meals).       cyclobenzaprine (FLEXERIL) 10 mg tablet Take 1 Tablet by mouth nightly as needed for Muscle Spasm(s). 30 Tablet 0    metoprolol succinate (TOPROL-XL) 100 mg tablet Take 100 mg by mouth daily.  aspirin 81 mg chewable tablet Take 81 mg by mouth daily.  atorvastatin (Lipitor) 20 mg tablet Take 1 Tab by mouth daily. Indications: treatment to slow progression of coronary artery disease 30 Tab 5       PSHx  has a past surgical history that includes hx rotator cuff repair (Right) and hx coronary stent placement (03/09/2021). SocHx  reports that he quit smoking about 3 years ago. He has a 39.00 pack-year smoking history. He has never used smokeless tobacco. He reports current alcohol use of about 10.0 standard drinks of alcohol per week. He reports that he does not use drugs. FHx family history includes Coronary Art Dis in his father and mother; Diabetes in his mother; Stroke in his father. PHYSICAL EXAM    Patient Vitals for the past 4 hrs:   Pulse BP   02/27/22 1308 91 137/84         General: Head: atraumatic. Eyes: conjunctiva clear. Neck: supple. Lungs: not examined. CV: not examined. no edema. Skin: no visible rashes    Neurologic Exam:  Speech/ Language: normal.   Alertness: oriented x 3.  CNs: Smell: not tested. Visual Fields (II): full to confrontation. Pupils (II): not examined. Funduscopic: not examined. Extraocular movements (III, IV, VI): conjugate in all directions, no ORLY. Ptosis (III, VII): none. Facial Sensation (V): intact LT on both sides. Facial Movements (VII): symmetric at rest and on activation. Hearing (VIII): normal.  Soft palate elevation (IX): not examined. Shoulder shrug (XI): not examined. Tongue protrusion (XII): not examined    Motor:  5/5 in all extremities. Sensory: intact LT in all exts. Cerebellar: no resting tremors.   Deep Tendon Reflexes/ Plantar response/ Gait/ Romberg: deferred      Labs/ Radiology     Labs:   POC Glucose 182  Lactic acid 1.2 (was 2.6)  Covid-19 rapid test negative/ not detected  CMP: Na 129, Cr 1.43, ALT 97 (mild elevated), AST 64 (elevated)  CBC WBC 11.1, Hgb 12.7, Hct 36 (bordeline low), normal platelet count    Imaging: see HPI    Assessment/ Plan       ICD-10-CM ICD-9-CM    1. Sepsis, due to unspecified organism, unspecified whether acute organ dysfunction present ([facility]  A41.9 038.9      995.91    2. Acute kidney injury ([facility]  N17.9 584.9    3. Lactic acidosis  E87.2 276.2    4. Lightheadedness  R42 780.4    5. Hyponatremia  E87.1 276.1    6. HCAP (healthcare-associated pneumonia)  J18.9 486        Dizziness and visual disturbance since mid-last week and reportedly low BP measurements at home. D/w patient that given that Brain MRI is normal, the dizziness and tunnel-like vision are due to the low blood pressure. He expressed understanding. No additional neurology testing is needed  Please call Neurology back if any further questions/ concerns      Thank you for asking the Neurology Service to evaluate Tami Ayala.       Signed By: Bruce Aquino MD     February 27, 2022      3

## 2022-02-27 NOTE — H&P
History and Physical  NAME: Blanka Mcfadden  MRN: [id number]  YOB: 1965  AGE: 61 y.o.  male  SOCIAL SECURITY NUMBER: [id number]  Primary Care Provider: Princess Nano NP  CODE STATUS: Full Code      ASSESSMENT/PLAN:    1. Acute kidney injury. Likely secondary to dehydration. Baseline creatinine is about 0.6-0.7. Patient received 1 L normal saline bolus in the emergency department. Continue gentle IV fluids with normal saline at 100 cc/h.  2.  Dizziness with visual changes. Feel that this was primarily due to dehydration and near syncope/generalized weakness. Regardless, patient has the same risk factors for stroke as he does for known coronary artery disease. Will request MRI of the brain. Will request echocardiogram.  Code neuro was called after patient presented to the emergency department. Head CT without contrast is negative; CT angiogram of the head and neck does not show any large vessel occlusion. Will request neurology consultation. 3.  Lactic acidosis. Likely secondary to dehydration. Lactic acid levels have improved from 5.4 initially to 2.6, and then 1.2.  4.  Coronary artery disease. S/p stent to the left anterior descending artery in March, 2021. Continue aspirin, Plavix, atorvastatin. Resume Toprol-XL when blood pressure can tolerate. 5.  Recent COVID-19 virus infection. COVID-19 PCR as well as antigen test is negative at this time. We are continuing to monitor inflammatory markers. Feel that infiltrate on chest x-ray is related to COVID-19, not acute bacterial pneumonia. Procalcitonin levels are negative--patient was given 1 dose of vancomycin and 1 dose of Zosyn in the emergency department, but antibiotics may not be indicated at this time. Echocardiogram requested;  there is concern for interstitial edema, especially as patient gained 10-11 pounds within a matter of couple of weeks recently.   6.  Insulin-dependent diabetes mellitus. Patient is on Trulicity weekly at home. Sliding scale insulin while here. Hold Metformin. 7.  History of tobacco use. Suspect underlying COPD. Patient has significant wheezing on exam.  Brovana 15 mcg nebulizer treatments twice a day, Pulmicort 0.5 mg nebulizer treatments twice a day. 8. Obesity, class II. Current BMI is 39.28. Patient states that he has never had any sleep studies. He is a candidate for obstructive sleep apnea. Nursing staff have noted that he does desaturate to the 80s when he sleeps. History of Presenting Illness:   Review of old records: The patient was admitted to [facility] for COVID pneumonia on 1/13/2022 and discharged on 1/16/2022. He was started on dexamethasone and baricitinib. His chest x-ray on the 01/13/2022 showed bilateral pulmonary infiltrate concerning for atypical viral pneumonia. The patient's inflammatory marker  CRP was 1.64 on the day of discharge while it was 7.51 on admission. Patient was saturating 98% on 2 liter/min. The patient was l hyponatremic at the time of admission. His hydrochlorothiazide was held. His overall condition is stable. Respiratory therapist did walk test and he did not require any supplemental oxygen. At time of discharge, patient was advised to continue Bumex 1 mg daily as needed. He was given a prescription for 7 days of dexamethasone 6 mg p.o. daily. Subjective: Patient is a 61 y.o. male who presents with complaints of dizziness that started on Thursday, 2/24/2022. Patient was hospitalized at [address] [place] [address] from 1/13/2022 until 1/16/2022 for COVID-19 pneumonia. He is unvaccinated. He states that the dizziness is getting worse, and now he has been having visual changes where he will see a [place] that starts fading and getting black. Patient did not have any syncope though. He denies any difficulty moving his arms or legs. He denies any diplopia.   Patient denies any slurring speech, word finding difficulty, although he does have trouble remembering specific facts about his illness--often stating that we should refer to his wife. Patient states that he has a persistent cough from the COVID-19 pneumonia, that waxes and wanes. He states that the cough was getting better when he first saw his primary medical doctor following hospitalization at [address] [place] [address]. He has seen his primary medical doctor 2 more times since discharge on 1/16/2021. He that he went back to see his primary medical doctor (second time since discharge ) about 2 weeks ago after having gained 11 pounds, and getting more short of breath with worsening cough. At that time, his PCP stated that he had \" fluid around his lungs\" and was started on a diuretic. Patient then went back to see his primary medical doctor again on Friday, February 25, 2022. At that time, patient complained of having a fullness and pain in the left lung. He was advised by his primary medical doctor that he may have fibrosis as a result of the COVID-19, and was restarted on steroids. Patient states that his cough is occasionally productive with clear phlegm. Patient states that he did not have a chronic cough prior to getting COVID-19 last month. Patient denies nausea, vomiting, chest pain, abdominal pain, loss of smell, generalized body aches, headache. He admits to having a lot of fatigue. He states that he had no appetite over the past 1 week, and did not eat much. Patient states that he has been having a little bit of diarrhea \" after the antibiotic was started\". Patient however was not discharged on any antibiotics on 1/16/2022--not quite sure which antibiotic he is referring to. Patient denies any recent fever, chills. He denies runny nose, sore throat, sinus congestion, postnasal drip. He states that the only remaining symptoms from COVID-19 are the fatigue, shortness of breath, coughing.          Review of Systems:  A comprehensive review of systems was negative except for that written in the History of Present Illness. Past Medical History:   Diagnosis Date    Coronary artery disease     s/p drug eluting stent to mid-LAD in March, 2021    COVID-19 01/2022    Tested positive on 1/13/2022.  Diabetes ([address]     Hypertension     Prostate cancer [facility])     s/p brachytherapy        PAST SURGICAL HISTORY:   Past Surgical History:   Procedure Laterality Date    HX CORONARY STENT PLACEMENT  03/09/2021    Successful BONI to mid LAD    HX ROTATOR CUFF REPAIR Right        Prior to Admission medications    Medication Sig Start Date End Date Taking? Authorizing Provider   dexAMETHasone (DECADRON) 6 mg tablet Take 1 Tablet by mouth Daily (before breakfast). 1/16/22   Edie MD Jeovany   clopidogreL (PLAVIX) 75 mg tab Take 75 mg by mouth daily. Provider, Historical   olmesartan (BENICAR) 40 mg tablet Take 40 mg by mouth daily. Provider, Historical   bumetanide (BUMEX) 1 mg tablet Take 1 mg by mouth daily as needed. Indications: visible water retention    Provider, Historical   ascorbic acid, vitamin C, (VITAMIN C) 500 mg tablet Take 500 mg by mouth daily. Provider, Historical   cholecalciferol, vitamin D3, (Vitamin D3) 50 mcg (2,000 unit) tab Take 2,000 Units by mouth daily. Provider, Historical   zinc sulfate (ZINC-220 PO) Take 1 Caplet by mouth daily. Provider, Historical   multivitamin (ONE A DAY) tablet Take 1 Tablet by mouth daily. Provider, Historical   diclofenac EC (VOLTAREN) 50 mg EC tablet Take 50 mg by mouth four (4) times daily as needed for Pain. 11/12/21   Provider, Historical   Trulicity 0.24 RV/2.2 mL sub-q pen 0.75 mg by SubCUTAneous route every seven (7) days. 11/17/21   Provider, Historical   metFORMIN (GLUCOPHAGE) 500 mg tablet Take 500 mg by mouth two (2) times daily (with meals). 11/14/21   Provider, Historical   cyclobenzaprine (FLEXERIL) 10 mg tablet Take 1 Tablet by mouth nightly as needed for Muscle Spasm(s). 12/1/21   Esa Bermudez MD   metoprolol succinate (TOPROL-XL) 100 mg tablet Take 100 mg by mouth daily. Provider, Historical   aspirin 81 mg chewable tablet Take 81 mg by mouth daily. Provider, Historical   atorvastatin (Lipitor) 20 mg tablet Take 1 Tab by mouth daily. Indications: treatment to slow progression of coronary artery disease 3/8/21   Ivania Interiano MD       Allergies   Allergen Reactions    Shellfish Derived Nausea Only        Family History   Problem Relation Age of Onset    Diabetes Mother     Coronary Art Dis Mother     Coronary Art Dis Father     Stroke Father         Social History     Tobacco Use    Smoking status: Former Smoker     Packs/day: 1.00     Years: 39.00     Pack years: 39.00     Quit date: 2019     Years since quitting: 3.1    Smokeless tobacco: Never Used   Vaping Use    Vaping Use: Never used   Substance Use Topics    Alcohol use: Yes     Alcohol/week: 10.0 standard drinks     Types: 10 Cans of beer per week     Comment: 1-2 beers daily    Drug use: Never       Physical exam  Patient Vitals for the past 24 hrs:   BP Temp Pulse Resp SpO2  oxygen therapy   02/27/22 0521 (!) 104/56 -- 93 20 94 %  2 L/min   02/27/22 0401 -- -- 97 -- --    02/27/22 0334 -- -- -- -- 99 %  2 L/min   02/27/22 0330 (!) 116/54 -- 92 14 97 %    02/27/22 0215 (!) 94/46 -- 93 17 95 %    02/27/22 0116 -- -- 99 -- --    02/27/22 0044 -- -- -- -- --    02/27/22 0043 (!) 105/47 -- (!) 103 21 95 %    02/26/22 2354 -- -- -- -- --    02/26/22 2339 (!) 93/52 97.7 °F (36.5 °C) (!) 125 24 97 %      Estimated body mass index is 39.28 kg/m² as calculated from the following:    Height as of this encounter: 6' 1\" (1.854 m). Weight as of this encounter: 135 kg (297 lb 11.2 oz). General: In no acute distress. Well developed, well nourished. Head: Normocephalic, atraumatic. Eyes: Anicteric sclera. PERRL. Extraocular muscles intact.   ENT: External ears and nose appear normal.  Oral mucosa moist.  Neck: Supple. No jugular venous distention. Heart: Regular rate and rhythm. No murmurs appreciated. Chest: Symmetrical excursion. Wheezing auscultated about the neck, as well as to lesser extent, throughout the bilateral lung fields, especially in the expiratory phase. Patient also did have some wheezing in the inspiratory phase. He had a prolonged expiratory phase. Abdomen: Soft, nontender. No abnormal distention. Bowel sounds are present throughout. Extremities: No gross deformities. No edema, no cyanosis. Feet are warm to touch. Neurological: No lateralizing deficits. Alert, oriented X3. Skin: No jaundice. No rashes. Recent Results (from the past 24 hour(s))   CBC WITH AUTOMATED DIFF    Collection Time: 02/27/22 12:14 AM   Result Value Ref Range    WBC 11.1 4.1 - 11.1 K/uL    RBC 3.80 (L) 4.10 - 5.70 M/uL    HGB 12.7 12.1 - 17.0 g/dL    HCT 36.0 (L) 36.6 - 50.3 %    MCV 94.7 80.0 - 99.0 FL    MCH 33.4 26.0 - 34.0 PG    MCHC 35.3 30.0 - 36.5 g/dL    RDW 13.2 11.5 - 14.5 %    PLATELET 123 978 - 904 K/uL    MPV 9.6 8.9 - 12.9 FL    NRBC 0.0 0  WBC    ABSOLUTE NRBC 0.00 0.00 - 0.01 K/uL    NEUTROPHILS 76 (H) 32 - 75 %    LYMPHOCYTES 19 12 - 49 %    MONOCYTES 4 (L) 5 - 13 %    EOSINOPHILS 0 0 - 7 %    BASOPHILS 0 0 - 1 %    IMMATURE GRANULOCYTES 1 (H) 0.0 - 0.5 %    ABS. NEUTROPHILS 8.4 (H) 1.8 - 8.0 K/UL    ABS. LYMPHOCYTES 2.1 0.8 - 3.5 K/UL    ABS. MONOCYTES 0.5 0.0 - 1.0 K/UL    ABS. EOSINOPHILS 0.0 0.0 - 0.4 K/UL    ABS. BASOPHILS 0.0 0.0 - 0.1 K/UL    ABS. IMM.  GRANS. 0.1 (H) 0.00 - 0.04 K/UL    DF AUTOMATED     METABOLIC PANEL, COMPREHENSIVE    Collection Time: 02/27/22 12:14 AM   Result Value Ref Range    Sodium 129 (L) 136 - 145 mmol/L    Potassium 3.7 3.5 - 5.1 mmol/L    Chloride 92 (L) 97 - 108 mmol/L    CO2 23 21 - 32 mmol/L    Anion gap 14 5 - 15 mmol/L    Glucose 195 (H) 65 - 100 mg/dL    BUN 10 6 - 20 MG/DL    Creatinine 1.43 (H) 0.70 - 1.30 MG/DL    BUN/Creatinine ratio 7 (L) 12 - 20      GFR est AA >60 >60 ml/min/1.73m2    GFR est non-AA 51 (L) >60 ml/min/1.73m2    Calcium 8.9 8.5 - 10.1 MG/DL    Bilirubin, total 1.8 (H) 0.2 - 1.0 MG/DL    ALT (SGPT) 97 (H) 12 - 78 U/L    AST (SGOT) 64 (H) 15 - 37 U/L    Alk. phosphatase 83 45 - 117 U/L    Protein, total 6.8 6.4 - 8.2 g/dL    Albumin 2.9 (L) 3.5 - 5.0 g/dL    Globulin 3.9 2.0 - 4.0 g/dL    A-G Ratio 0.7 (L) 1.1 - 2.2     PROTHROMBIN TIME + INR    Collection Time: 02/27/22 12:14 AM   Result Value Ref Range    INR 1.2 (H) 0.9 - 1.1      Prothrombin time 11.9 (H) 9.0 - 11.1 sec   LACTIC ACID    Collection Time: 02/27/22 12:14 AM   Result Value Ref Range    Lactic acid 5.4 (HH) 0.4 - 2.0 MMOL/L   TROPONIN-HIGH SENSITIVITY    Collection Time: 02/27/22 12:14 AM   Result Value Ref Range    Troponin-High Sensitivity 7 0 - 76 ng/L   PROCALCITONIN    Collection Time: 02/27/22 12:14 AM   Result Value Ref Range    Procalcitonin 0.21 ng/mL   NT-PRO BNP    Collection Time: 02/27/22 12:14 AM   Result Value Ref Range    NT pro- (H) <125 PG/ML   D DIMER    Collection Time: 02/27/22 12:14 AM   Result Value Ref Range    D-dimer 1.17 (H) 0.00 - 0.65 mg/L FEU   LD    Collection Time: 02/27/22 12:14 AM   Result Value Ref Range     (H) 85 - 241 U/L   SAMPLES BEING HELD    Collection Time: 02/27/22 12:18 AM   Result Value Ref Range    SAMPLES BEING HELD 1RD,PAIR BC     COMMENT        Add-on orders for these samples will be processed based on acceptable specimen integrity and analyte stability, which may vary by analyte.    CULTURE, BLOOD    Collection Time: 02/27/22 12:18 AM    Specimen: Blood   Result Value Ref Range    Special Requests: NO SPECIAL REQUESTS      Culture result: NO GROWTH AFTER 3 HOURS     CULTURE, BLOOD    Collection Time: 02/27/22 12:18 AM    Specimen: Blood   Result Value Ref Range    Special Requests: NO SPECIAL REQUESTS      Culture result: NO GROWTH AFTER 3 HOURS     GLUCOSE, POC    Collection Time: 02/27/22 12:21 AM   Result Value Ref Range    Glucose (POC) 198 (H) 65 - 117 mg/dL    Performed by Eve MIAN)    COVID-19 RAPID TEST    Collection Time: 02/27/22  1:18 AM   Result Value Ref Range    Specimen source Nasopharyngeal      COVID-19 rapid test Not detected NOTD     LACTIC ACID    Collection Time: 02/27/22  2:49 AM   Result Value Ref Range    Lactic acid 2.6 (HH) 0.4 - 2.0 MMOL/L   LACTIC ACID    Collection Time: 02/27/22  6:31 AM   Result Value Ref Range    Lactic acid 1.2 0.4 - 2.0 MMOL/L   GLUCOSE, POC    Collection Time: 02/27/22  7:47 AM   Result Value Ref Range    Glucose (POC) 140 (H) 65 - 117 mg/dL    Performed by Meron Stephens (Tech)        XR CHEST PORT  Narrative: INDICATION: sob    EXAM:  AP CHEST RADIOGRAPH    COMPARISON: January 13, 2022    FINDINGS:    AP portable view of the chest demonstrates a normal cardiomediastinal  silhouette. Interval decrease in diffuse interstitial and airspace opacities in  both lungs. The osseous structures are unremarkable. Impression: Interval decrease in diffuse interstitial and airspace opacities, with mild  residual disease again concerning for viral/atypical pneumonia. CTA CODE NEURO HEAD AND NECK W CONT  PRELIMINARY REPORT    No perfusion abnormality. No acute large vessel occlusion. Patchy groundglass  opacities/interlobular septal opacities in the upper lung zones could be edema  and/or viral/atypical pneumonia    Preliminary report was provided by Dr. Hodges Hidden the on-call radiologist, at 1079    Final report to follow. CT CODE NEURO PERF W CBF  PRELIMINARY REPORT    No perfusion abnormality. No acute large vessel occlusion. Patchy groundglass  opacities/interlobular septal opacities in the upper lung zones could be edema  and/or viral/atypical pneumonia    Preliminary report was provided by Dr. Hodges Hidden the on-call radiologist, at [id number]    Final report to follow.       CT CODE NEURO HEAD WO CONTRAST  Narrative: INDICATION: Code Stroke    EXAM:  HEAD CT WITHOUT CONTRAST    COMPARISON: None    TECHNIQUE:  Routine noncontrast axial head CT was performed. Sagittal and  coronal reconstructions were generated. CT dose reduction was achieved through use of a standardized protocol tailored  for this examination and automatic exposure control for dose modulation. FINDINGS:    Ventricles: Midline, no hydrocephalus. Intracranial Hemorrhage: None. Brain Parenchyma/Brainstem: Normal for age. Basal Cisterns: Normal.  Paranasal Sinuses: Visualized sinuses are clear. Additional Comments: N/A. Impression: No acute process.

## 2022-02-27 NOTE — ED PROVIDER NOTES
69-year-old male with history of diabetes and hypertension presents with a chief complaint of dizziness. Patient reports with symptoms started sometime this morning. He also complains that his vision has been intermittently going black. He denies chest pain, shortness of breath, abdominal pain, GI or urinary symptoms. Patient also reports history of recent Covid infection which was complicated by pneumonia and pericardial effusion. Past Medical History:   Diagnosis Date    [facility]     Diabetes ([facility]     Hypertension        No past surgical history on file. No family history on file. Social History     Socioeconomic History    Marital status:      Spouse name: Not on file    Number of children: Not on file    Years of education: Not on file    Highest education level: Not on file   Occupational History    Not on file   Tobacco Use    Smoking status: Former Smoker    Smokeless tobacco: Never Used   Vaping Use    Vaping Use: Never used   Substance and Sexual Activity    Alcohol use: Yes    Drug use: Never    Sexual activity: Not on file   Other Topics Concern    Not on file   Social History Narrative    Not on file     Social Determinants of Health     Financial Resource Strain:     Difficulty of Paying Living Expenses: Not on file   Food Insecurity:     Worried About Running Out of Food in the Last Year: Not on file    Branden of Food in the Last Year: Not on file   Transportation Needs:     Lack of Transportation (Medical): Not on file    Lack of Transportation (Non-Medical):  Not on file   Physical Activity:     Days of Exercise per Week: Not on file    Minutes of Exercise per Session: Not on file   Stress:     Feeling of Stress : Not on file   Social Connections:     Frequency of Communication with Friends and Family: Not on file    Frequency of Social Gatherings with Friends and Family: Not on file    Attends Holiness Services: Not on file   [facility] Active Member of Clubs or Organizations: Not on file    Attends Club or Organization Meetings: Not on file    Marital Status: Not on file   Intimate Partner Violence:     Fear of Current or Ex-Partner: Not on file    Emotionally Abused: Not on file    Physically Abused: Not on file    Sexually Abused: Not on file   Housing Stability:     Unable to Pay for Housing in the Last Year: Not on file    Number of [place] in the Last Year: Not on file    Unstable Housing in the Last Year: Not on file         ALLERGIES: Shellfish derived    Review of Systems   Constitutional: Negative for fever. HENT: Negative for rhinorrhea. Eyes: Positive for visual disturbance. Respiratory: Positive for shortness of breath. Cardiovascular: Negative for chest pain. Gastrointestinal: Negative for abdominal pain. Genitourinary: Negative for dysuria. Musculoskeletal: Negative for back pain. Skin: Negative for wound. Neurological: Positive for dizziness. Negative for headaches. Psychiatric/Behavioral: Negative for confusion. There were no vitals filed for this visit. Physical Exam  Vitals and nursing note reviewed. Constitutional:       General: He is not in acute distress. Appearance: Normal appearance. He is obese. He is not ill-appearing, toxic-appearing or diaphoretic. HENT:      Head: Normocephalic. Eyes:      Extraocular Movements: Extraocular movements intact. Cardiovascular:      Rate and Rhythm: Normal rate. Pulses: Normal pulses. Heart sounds: Normal heart sounds. Pulmonary:      Breath sounds: Wheezing present. Abdominal:      General: Abdomen is flat. There is distension. Palpations: Abdomen is soft. Tenderness: There is no abdominal tenderness. There is no guarding. Musculoskeletal:         General: Normal range of motion. Cervical back: Normal range of motion. Skin:     General: Skin is dry.       Capillary Refill: Capillary refill takes less than 2 seconds. Neurological:      Mental Status: He is alert and oriented to person, place, and time. Psychiatric:         Mood and Affect: Mood normal.          MDM  Number of Diagnoses or Management Options  Acute kidney injury ([address]  HCAP (healthcare-associated pneumonia)  Hyponatremia  Lactic acidosis  Lightheadedness  Sepsis, due to unspecified organism, unspecified whether acute organ dysfunction present [facility])  Diagnosis management comments:       79-year-old male presents with dizziness and vision changes. Starting for stroke; therefore, a level 2 stroke was activated and the patient was taken for CT imaging which was unremarkable. He was evaluated by teleneurology. Patient's initial NIH according to the teleneurology is 0. He is obviously not a TPA candidate for this reason and time considerations. Patient was found to be tachycardic and hypotensive which is concerning for sepsis. Patient was given IV fluids. His lactic acid level is found to be greater than 5. At this time there is no obvious source although a pneumonia is a presumptive diagnosis at this time. He will be covered with broad-spectrum antibiotics for hospital-acquired pneumonia and admitted to hospital medicine. He is comfortable and agreeable to plan of care. Bedside ultrasound shows a small pericardial effusion without tamponade physiology. EKG shows sinus tachycardia at a rate of 110, normal intervals, normal axis, no ischemic changes.       Perfect Serve Consult for Admission  1:28 AM    ED Room Number: TJ82/19  Patient Name and age:  Tami Ayala 61 y.o.  male  Working Diagnosis: Sepsis, due to unspecified organism, unspecified whether acute organ dysfunction present ([facility]  (primary encounter diagnosis)  Acute kidney injury ([facility]  Lactic acidosis  Lightheadedness  Hyponatremia  HCAP (healthcare-associated pneumonia)    COVID-19 Suspicion:  yes  Sepsis present:  yes  Reassessment needed: yes  Code Status:  Full Code  Readmission: yes  Isolation Requirements:  yes  Recommended Level of Care:  telemetry  Department:[facility] ED - [phone]  Other: Stroke alert initially activated due to symptoms of dizziness and vision changes. Imaging unremarkable. Patient not a TPA candidate. Symptoms likely related to lactic acidosis, sepsis. Antibiotics ordered. 4 L of IV fluid ordered per sepsis bundle     Code Sepsis Reassessment & Plan    - Sepsis order set entered: YES  - Broad Spectrum Antibiotics given: See orders  - Repeat lactic acid ordered for time see orders  - Re-assessment performed at time: see hospital medicine notes and clinical condition: see hospital medicine notes  - Actions taken: See orders.   - Hypotension or Lactic Acidosis present (SBP<90, MAP<65, Lactate >4): YES   - IVF: 30 cc/kg actual Body Weight  - Persistent Septic Shock present (Hypotension despite IVF resuscitation): NO  Vasopressors: Not indicated  - Disposition: Admit to telemetry        Total critical care time spent exclusive of procedures: 45 minutes           Amount and/or Complexity of Data Reviewed  Clinical lab tests: ordered and reviewed  Tests in the radiology section of CPT®: ordered and reviewed           Procedures

## 2022-02-27 NOTE — PROGRESS NOTES
TRANSFER - IN REPORT:    Verbal report received from [address] [place] [address] CHRISTIANO Briceno(name) on Elayne Cyr  being received from ER(unit) for routine progression of care      Report consisted of patients Situation, Background, Assessment and   Recommendations(SBAR). Information from the following report(s) SBAR, Kardex, ED Summary, Procedure Summary, Intake/Output, MAR, Recent Results, Med Rec Status and Cardiac Rhythm NSR was reviewed with the receiving nurse. Opportunity for questions and clarification was provided. Assessment completed upon patients arrival to unit and care assumed. Bedside shift change report given to Rukhsana Arellano RN (oncoming nurse) by Izabella Romero RN (offgoing nurse). Report included the following information SBAR, Kardex, ED Summary, Procedure Summary, Intake/Output, MAR, Recent Results, Med Rec Status and Cardiac Rhythm NSR.

## 2022-02-27 NOTE — ED TRIAGE NOTES
Patient complaining of vision changes-things getting black-since this morning and dizziness. Patient also endorses right arm numbness. Patient SOB in triage.  Level 2 code stroke

## 2022-02-27 NOTE — ED NOTES
TRANSFER - OUT REPORT:    Verbal report given to [facility] RN (name) on Theresa Joaquin  being transferred to [facility] (unit) for routine progression of care       Report consisted of patients Situation, Background, Assessment and   Recommendations(SBAR). Information from the following report(s) SBAR, ED Summary, Intake/Output, MAR and Recent Results was reviewed with the receiving nurse. Lines:   Peripheral IV 02/27/22 Right Antecubital (Active)   Site Assessment Clean, dry, & intact 02/27/22 0023   Phlebitis Assessment 0 02/27/22 0023   Infiltration Assessment 0 02/27/22 0023   Dressing Status Clean, dry, & intact 02/27/22 0023       Peripheral IV 02/27/22 Left Hand (Active)   Site Assessment Clean, dry, & intact 02/27/22 0024   Phlebitis Assessment 0 02/27/22 0024   Infiltration Assessment 0 02/27/22 0024   Dressing Status Clean, dry, & intact 02/27/22 0024        Opportunity for questions and clarification was provided.       Patient transported with:   [employer]

## 2022-02-27 NOTE — PROGRESS NOTES
2/27/2022.   Saw patient on my encounter, awake alert and oriented, reporting significant improvement of his symptoms since admission, awake alert and oriented, in no apparent distress, denies any fever or any chills, he has tested positive again for COVID-19, plan of care as outlined by admitting physician and her H&P.

## 2022-02-27 NOTE — PROGRESS NOTES
[facility] Progress Note: Antimicrobial Stewardship    Consult for antibiotic dosing of Vancomycin by Dr. Wild Villanueva  Indication: sepsis  Day of Therapy: 1    Plan:  Vancomycin therapy:   Start with loading dose of vancomycin 2500 mg IV (25 mg/kg, max 2.5 gm)   Follow with maintenance dose of vancomycin : by random level due to HARDY   Dose calculated to approximate a   Target AUC/CHRISTIAN: n/a  Trough of : </=15 mcg/mL. Plan:     Pharmacy to follow daily and will make changes to dose and/or frequency based on clinical status. Date Dose & Interval Measured (mcg/mL) Extrapolated (mcg/mL)   ? ? ? ?   ? ? ? ?   ? ? ? ? Non-Kinetic Antimicrobial Dosing:   Current Regimen:    Recommendation:   Dose administration notes: Other Antimicrobial  (not dosed by pharmacist)   zosyn   Cultures        Serum Creatinine     Lab Results   Component Value Date/Time    Creatinine 1.43 (H) 02/27/2022 12:14 AM       Creatinine Clearance Estimated Creatinine Clearance: 82.1 mL/min (A) (based on SCr of 1.43 mg/dL (H)). Procalcitonin    Lab Results   Component Value Date/Time    Procalcitonin 0.10 01/15/2022 01:24 AM        Temp   97.7 °F (36.5 °C)    WBC   Lab Results   Component Value Date/Time    WBC 11.1 02/27/2022 12:14 AM       For Antifungals, Metronidazole and Nafcillin: Lab Results   Component Value Date/Time    ALT (SGPT) 97 (H) 02/27/2022 12:14 AM    AST (SGOT) 64 (H) 02/27/2022 12:14 AM    Alk.  phosphatase 83 02/27/2022 12:14 AM    Bilirubin, total 1.8 (H) 02/27/2022 12:14 AM         Pharmacist: Summer Sargent

## 2022-02-28 ENCOUNTER — APPOINTMENT (OUTPATIENT)
Dept: NON INVASIVE DIAGNOSTICS | Age: 57
DRG: 683 | End: 2022-02-28
Attending: INTERNAL MEDICINE
Payer: COMMERCIAL

## 2022-02-28 VITALS
HEIGHT: 73 IN | HEART RATE: 76 BPM | RESPIRATION RATE: 15 BRPM | SYSTOLIC BLOOD PRESSURE: 117 MMHG | OXYGEN SATURATION: 96 % | WEIGHT: 293 LBS | DIASTOLIC BLOOD PRESSURE: 67 MMHG | TEMPERATURE: 97.9 F | BODY MASS INDEX: 38.83 KG/M2

## 2022-02-28 LAB
25(OH)D3 SERPL-MCNC: 40.4 NG/ML (ref 30–100)
ANION GAP SERPL CALC-SCNC: 5 MMOL/L (ref 5–15)
BACTERIA SPEC CULT: NORMAL
BACTERIA SPEC CULT: NORMAL
BASOPHILS # BLD: 0 K/UL (ref 0–0.1)
BASOPHILS NFR BLD: 0 % (ref 0–1)
BUN SERPL-MCNC: 9 MG/DL (ref 6–20)
BUN/CREAT SERPL: 13 (ref 12–20)
CALCIUM SERPL-MCNC: 8.9 MG/DL (ref 8.5–10.1)
CHLORIDE SERPL-SCNC: 107 MMOL/L (ref 97–108)
CO2 SERPL-SCNC: 24 MMOL/L (ref 21–32)
CREAT SERPL-MCNC: 0.68 MG/DL (ref 0.7–1.3)
D DIMER PPP FEU-MCNC: 0.78 MG/L FEU (ref 0–0.65)
DIFFERENTIAL METHOD BLD: ABNORMAL
ECHO AO ASC DIAM: 3.2 CM
ECHO AO ASCENDING AORTA INDEX: 1.26 CM/M2
ECHO AV AREA PEAK VELOCITY: 3.8 CM2
ECHO AV AREA PEAK VELOCITY: 3.8 CM2
ECHO AV AREA VTI: 3.9 CM2
ECHO AV AREA VTI: 3.9 CM2
ECHO AV MEAN GRADIENT: 3 MMHG
ECHO AV MEAN VELOCITY: 0.8 M/S
ECHO AV PEAK GRADIENT: 6 MMHG
ECHO AV PEAK VELOCITY: 1.2 M/S
ECHO AV VELOCITY RATIO: 0.75
ECHO AV VTI: 24.5 CM
ECHO LA DIAMETER INDEX: 1.74 CM/M2
ECHO LA DIAMETER: 4.4 CM
ECHO LA VOL 2C: 60 ML (ref 18–58)
ECHO LA VOL 4C: 92 ML (ref 18–58)
ECHO LA VOL BP: 82 ML (ref 18–58)
ECHO LA VOL BP: 82 ML (ref 18–58)
ECHO LA VOLUME AREA LENGTH: 91 ML
ECHO LA VOLUME INDEX A2C: 24 ML/M2 (ref 16–34)
ECHO LA VOLUME INDEX A4C: 36 ML/M2 (ref 16–34)
ECHO LA VOLUME INDEX AREA LENGTH: 36 ML/M2 (ref 16–34)
ECHO LV E' LATERAL VELOCITY: 13 CM/S
ECHO LV E' SEPTAL VELOCITY: 12 CM/S
ECHO LV FRACTIONAL SHORTENING: 19 % (ref 28–44)
ECHO LV INTERNAL DIMENSION DIASTOLE INDEX: 1.86 CM/M2
ECHO LV INTERNAL DIMENSION DIASTOLIC: 4.7 CM (ref 4.2–5.9)
ECHO LV INTERNAL DIMENSION SYSTOLIC INDEX: 1.5 CM/M2
ECHO LV INTERNAL DIMENSION SYSTOLIC: 3.8 CM
ECHO LV IVSD: 1.2 CM (ref 0.6–1)
ECHO LV MASS 2D: 212 G (ref 88–224)
ECHO LV MASS INDEX 2D: 83.8 G/M2 (ref 49–115)
ECHO LV POSTERIOR WALL DIASTOLIC: 1.2 CM (ref 0.6–1)
ECHO LV RELATIVE WALL THICKNESS RATIO: 0.51
ECHO LVOT AREA: 5.3 CM2
ECHO LVOT AV VTI INDEX: 0.76
ECHO LVOT DIAM: 2.6 CM
ECHO LVOT MEAN GRADIENT: 1 MMHG
ECHO LVOT PEAK GRADIENT: 3 MMHG
ECHO LVOT PEAK VELOCITY: 0.9 M/S
ECHO LVOT STROKE VOLUME INDEX: 38.8 ML/M2
ECHO LVOT SV: 98.2 ML
ECHO LVOT VTI: 18.5 CM
ECHO MV A VELOCITY: 0.62 M/S
ECHO MV E DECELERATION TIME (DT): 163.5 MS
ECHO MV E VELOCITY: 0.94 M/S
ECHO MV E/A RATIO: 1.52
ECHO MV E/E' LATERAL: 7.23
ECHO MV E/E' RATIO (AVERAGED): 7.53
ECHO MV E/E' SEPTAL: 7.83
ECHO MV REGURGITANT PEAK GRADIENT: 81 MMHG
ECHO MV REGURGITANT PEAK VELOCITY: 4.5 M/S
ECHO PV MAX VELOCITY: 1.1 M/S
ECHO PV PEAK GRADIENT: 5 MMHG
ECHO RV INTERNAL DIMENSION: 4.6 CM
ECHO RV TAPSE: 2.7 CM (ref 1.5–2)
ECHO RVOT PEAK GRADIENT: 2 MMHG
ECHO RVOT PEAK VELOCITY: 0.7 M/S
ECHO TV REGURGITANT MAX VELOCITY: 2.84 M/S
ECHO TV REGURGITANT PEAK GRADIENT: 32 MMHG
EOSINOPHIL # BLD: 0 K/UL (ref 0–0.4)
EOSINOPHIL NFR BLD: 0 % (ref 0–7)
ERYTHROCYTE [DISTWIDTH] IN BLOOD BY AUTOMATED COUNT: 13.2 % (ref 11.5–14.5)
GLUCOSE BLD STRIP.AUTO-MCNC: 153 MG/DL (ref 65–117)
GLUCOSE BLD STRIP.AUTO-MCNC: 181 MG/DL (ref 65–117)
GLUCOSE BLD STRIP.AUTO-MCNC: 318 MG/DL (ref 65–117)
GLUCOSE SERPL-MCNC: 178 MG/DL (ref 65–100)
HCT VFR BLD AUTO: 34.9 % (ref 36.6–50.3)
HGB BLD-MCNC: 11.7 G/DL (ref 12.1–17)
IMM GRANULOCYTES # BLD AUTO: 0 K/UL (ref 0–0.04)
IMM GRANULOCYTES NFR BLD AUTO: 1 % (ref 0–0.5)
LYMPHOCYTES # BLD: 0.9 K/UL (ref 0.8–3.5)
LYMPHOCYTES NFR BLD: 14 % (ref 12–49)
MCH RBC QN AUTO: 33.1 PG (ref 26–34)
MCHC RBC AUTO-ENTMCNC: 33.5 G/DL (ref 30–36.5)
MCV RBC AUTO: 98.9 FL (ref 80–99)
MONOCYTES # BLD: 0.4 K/UL (ref 0–1)
MONOCYTES NFR BLD: 7 % (ref 5–13)
NEUTS SEG # BLD: 5.2 K/UL (ref 1.8–8)
NEUTS SEG NFR BLD: 78 % (ref 32–75)
NRBC # BLD: 0 K/UL (ref 0–0.01)
NRBC BLD-RTO: 0 PER 100 WBC
PLATELET # BLD AUTO: 143 K/UL (ref 150–400)
PMV BLD AUTO: 9.4 FL (ref 8.9–12.9)
POTASSIUM SERPL-SCNC: 4.6 MMOL/L (ref 3.5–5.1)
RBC # BLD AUTO: 3.53 M/UL (ref 4.1–5.7)
SARS-COV-2, XPLCVT: NOT DETECTED
SERVICE CMNT-IMP: ABNORMAL
SERVICE CMNT-IMP: NORMAL
SODIUM SERPL-SCNC: 136 MMOL/L (ref 136–145)
SOURCE, COVRS: NORMAL
TROPONIN-HIGH SENSITIVITY: 6 NG/L (ref 0–76)
WBC # BLD AUTO: 6.6 K/UL (ref 4.1–11.1)

## 2022-02-28 PROCEDURE — 85025 COMPLETE CBC W/AUTO DIFF WBC: CPT

## 2022-02-28 PROCEDURE — 74011250637 HC RX REV CODE- 250/637: Performed by: INTERNAL MEDICINE

## 2022-02-28 PROCEDURE — 74011000250 HC RX REV CODE- 250: Performed by: INTERNAL MEDICINE

## 2022-02-28 PROCEDURE — 93306 TTE W/DOPPLER COMPLETE: CPT | Performed by: SPECIALIST

## 2022-02-28 PROCEDURE — 74011250636 HC RX REV CODE- 250/636: Performed by: INTERNAL MEDICINE

## 2022-02-28 PROCEDURE — C8929 TTE W OR WO FOL WCON,DOPPLER: HCPCS

## 2022-02-28 PROCEDURE — 85379 FIBRIN DEGRADATION QUANT: CPT

## 2022-02-28 PROCEDURE — 82306 VITAMIN D 25 HYDROXY: CPT

## 2022-02-28 PROCEDURE — 94640 AIRWAY INHALATION TREATMENT: CPT

## 2022-02-28 PROCEDURE — 80048 BASIC METABOLIC PNL TOTAL CA: CPT

## 2022-02-28 PROCEDURE — 36415 COLL VENOUS BLD VENIPUNCTURE: CPT

## 2022-02-28 PROCEDURE — 82962 GLUCOSE BLOOD TEST: CPT

## 2022-02-28 PROCEDURE — 84484 ASSAY OF TROPONIN QUANT: CPT

## 2022-02-28 PROCEDURE — 74011636637 HC RX REV CODE- 636/637: Performed by: INTERNAL MEDICINE

## 2022-02-28 RX ADMIN — Medication 2 UNITS: at 07:30

## 2022-02-28 RX ADMIN — ASPIRIN 81 MG: 81 TABLET, CHEWABLE ORAL at 09:04

## 2022-02-28 RX ADMIN — Medication 7 UNITS: at 17:00

## 2022-02-28 RX ADMIN — METOPROLOL SUCCINATE 25 MG: 25 TABLET, EXTENDED RELEASE ORAL at 09:04

## 2022-02-28 RX ADMIN — OXYCODONE HYDROCHLORIDE AND ACETAMINOPHEN 500 MG: 500 TABLET ORAL at 09:04

## 2022-02-28 RX ADMIN — ZINC SULFATE 220 MG (50 MG) CAPSULE 1 CAPSULE: CAPSULE at 09:05

## 2022-02-28 RX ADMIN — Medication 2 UNITS: at 11:50

## 2022-02-28 RX ADMIN — DEXAMETHASONE 6 MG: 6 TABLET ORAL at 05:47

## 2022-02-28 RX ADMIN — PERFLUTREN 2 ML: 6.52 INJECTION, SUSPENSION INTRAVENOUS at 11:52

## 2022-02-28 RX ADMIN — THERA TABS 1 TABLET: TAB at 09:06

## 2022-02-28 RX ADMIN — CLOPIDOGREL BISULFATE 75 MG: 75 TABLET ORAL at 09:05

## 2022-02-28 RX ADMIN — ATORVASTATIN CALCIUM 20 MG: 20 TABLET, FILM COATED ORAL at 09:06

## 2022-02-28 RX ADMIN — BUDESONIDE 500 MCG: 0.5 SUSPENSION RESPIRATORY (INHALATION) at 11:26

## 2022-02-28 RX ADMIN — Medication 2000 UNITS: at 09:04

## 2022-02-28 RX ADMIN — SODIUM CHLORIDE, PRESERVATIVE FREE 10 ML: 5 INJECTION INTRAVENOUS at 05:48

## 2022-02-28 RX ADMIN — SODIUM CHLORIDE 125 ML/HR: 9 INJECTION, SOLUTION INTRAVENOUS at 03:59

## 2022-02-28 RX ADMIN — ENOXAPARIN SODIUM 30 MG: 100 INJECTION SUBCUTANEOUS at 09:04

## 2022-02-28 RX ADMIN — ARFORMOTEROL TARTRATE 15 MCG: 15 SOLUTION RESPIRATORY (INHALATION) at 11:26

## 2022-02-28 NOTE — CONSULTS
Nutrition Education    RD consulted for diet education - related to chronic dehydration. Patient states he has no trouble staying hydrated, drinks \"a few\" Gatorade Zeros during the day in addition to water. Discussed monitoring amount of these d/t sodium content. Feels \"great enough to leave today\".      Electronically signed by Everett Melgar RD on 0/99/9647   Contact Number: [phone] or via [employer]

## 2022-02-28 NOTE — PROGRESS NOTES
Resting well this shift. NSR. VSS. Pt states he stays dehydrated and drinks a lot of Gatorade zero and water because of this. Pt has ~ 10 bottles of Gatorade zero in room. Nutritional consult placed for pt education.

## 2022-02-28 NOTE — PROGRESS NOTES
Bedside shift change report given to Lolly Houston (oncoming nurse) by [facility] (offgoing nurse). Report included the following information SBAR, Kardex, ED Summary, Procedure Summary, Intake/Output, MAR, Recent Results and Cardiac Rhythm NSR.

## 2022-02-28 NOTE — PROGRESS NOTES
Bedside shift change report given to Nieves Arriola (oncoming nurse) by Ting Coffman (offgoing nurse). Report included the following information SBAR, Kardex, ED Summary, Intake/Output, MAR, Recent Results and Cardiac Rhythm Normal Sinus.

## 2022-02-28 NOTE — PROGRESS NOTES
Reason for Admission:  Dizziness, vision changes. Hx - diabetes, HTN. COVID19 Vaccine:  no                          RUR Score:     13%/ low risk                Plan for utilizing home health:   None at this time, DME - glucose monitor       PCP: First and Last name:  Chaitanya Logan NP     Name of Practice:    Are you a current patient: Yes/No:   yes   Approximate date of last visit:  Last week   Can you participate in a virtual visit with your PCP:   yes                    Current Advanced Directive/Advance Care Plan: Full Code    Healthcare Decision Maker:   Click here to complete [id number] including selection of the Healthcare Decision Maker Relationship (ie \"Primary\")           Marilyn Moreira  [phone]                  Transition of Care Plan:    Chart reviewed, demographics verified. CM role and follow up discussed. Met with patient at bedside, face mask on. Patient lives with his wife. Patient lives in a two story home with 4 steps to enter home and 13 steps to upper level. Patient has prescription drug coverage, uses [facility] on [address]. Patient is independent, drives, and provides self care. Patient performs ADLs independently. Current status:  Patient currently requiring medical management including ongoing assessment and monitoring. Await echo results. PLAN:  1. Monitor patient response to treatment and recommendations. 2. Medical management continues. 3. Patient home with family assist at [place]. 4. Patient transport home per wife at discharge. 5. CM to monitor clinical progress and disposition recommendations. Care Management Interventions  PCP Verified by CM: Yes Marivel Flores NP)  Mode of Transport at Discharge:  Other (see comment) (per wife)  Transition of Care Consult (CM Consult): Discharge Planning  Support Systems: Spouse/Significant Other  Confirm Follow Up Transport: Family  The Plan for Transition of Care is Related to the Following Treatment Goals : return home at DC  Discharge Location  Patient Expects to be Discharged to[de-identified] Home with family assistance    Christine Acosta RN, MSN, Care manager

## 2022-03-01 NOTE — ADT AUTH CERT NOTES
INPATIENT ADMISSION NOTIF     ADMISSION DATE 2022    UR CONTACT IS ERNST ESCALONA [phone]  PHONE [phone]    [facility]     FACILITY NPI :[id number]  FACILITY TAX ID : [id number]     [facility]  SF 3 PROG CARE TELE 2  [address] [place] [address]  [address] [place] [address]  [phone]            Patient Name :Driss Anaya   :  (61 yrs)  MRN : [id number]     Patient Verbrittny Wayne [address] Vinny Velázquez [47] , [address]                                                               .         Insurance Plan Payor: Magalis Cordoba / Plan:   Primary Coverage Subscriber ID : [id number]             Current Patient Class : INPATIENT  Admit Date : 2022     REQUESTED LEVEL OF CARE: INPATIENT [101]                                                           Diagnosis : COVID-19                          ICD10 Code : Lactic acidosis [E87.2]  TRFWK-63 [U07.1]     Current Room and Bed 324/01     Admitting and Attending Info:  Admitting Provider : Terri Vigil MD     NPI: [id number]  Admitting Provider Phone. [phone]  Admitting Provider Address:   [address] [place] [address] [facility] [address]            Patient Demographics    Patient Name   Imtiaz Arzola Legal Sex   Male    1965 Address   [address] [facility] [address] Phone   [phone] ([employer])   [phone] [facility])       Hospital Account    Name Acct ID Class Status Primary Coverage   Imtiaz Arzola [id number] INPATIENT Discharged/Not [address] [facility]            Guarantor Account (for Hospital Account [de-identified])    Name Relation to Pt Service Area Active?  Acct Type   Imtiaz Arzola Self [facility] Yes Personal/Family   Address Phone     [facility] [address]   [address] [phone](M)              Coverage Information (for Hospital Account #[id number])    F/O Payor/Plan Subscriber  Subscriber Sex Precert #   BLUE CROSS/VA Wing[place] CROSS OUT OF STATE 63 M    Subscriber Subscriber #   Ferny Ruelass [id number]   [facility] # Group Name   Marni Alcantar   Address Phone   [address]    Policy Number Status Effective Date Benefits Phone   [id number] -  -   Auth/Cert   [id number]            Diagnosis     Codes Comments   Sepsis, due to unspecified organism, unspecified whether acute organ dysfunction present [facility])  ICD-10-CM: A41.9   ICD-9-CM: 038.9, 995.91             Admission Information    Arrival Date/Time: 2022 2333 Admit Date/Time: 2022 2344 IP Adm.  Date/Time: 2022 1613   Admission Type: Emergency Point of Origin: Non-health Care Facility/self Admit Category: Home   Means of Arrival: Car Primary Service: Medicine Secondary Service: N/A   Transfer Source:  Service Area: [place] Unit: [facility] 3 [facility] TELE 2   Admit Provider: Tonya Moncada MD Attending Provider: Yury Gordon MD Referring Provider:      Admission Information    Attending Provider Admission Dx Admitted on    Lactic acidosis, COVID-19 22   Service Isolation Code Status   Medicine -- Prior   Allergies Advance Care Planning    Shellfish Derived Jump to the Activity       Admission Information    Unit/Bed: [facility] 3 [facility] CARE TELE  Service: Medicine   Admitting provider: Tonya Moncada MD Phone: [phone]   Attending provider:  Phone:    PCP: Vu Loving NP Phone: [phone]   Admission dx:  Patient class: I   Admission type: ER       Patient Demographics    Patient Name   Llana Cowden   [id number] Legal Sex   Male    1965 Address   [place] [address] Phone   [phone] (Home)   [phone] (Mobile)     H&P Notes       H&P by Denise Mosquera DO at 22 0252 documented on ED to Hosp-Admission (Discharged) from 2022 in [address] [place] [address] 2    Author: Denise Mosquera DO Author Type: Physician Filed: 02/27/22 1521   Note Status: Addendum Cosign: Cosign Not Required Date of Service: 02/27/22 0252   : Josselin Arambula DO (Physician)       Prior Versions: 1. Josselin Arambula DO (Physician) at 02/27/22 1018 - Addendum    2. Josselin Arambula DO (Physician) at 02/27/22 1016 - Signed                                                   History and Physical  NAME: Judy Garcia  MRN: [id number]  YOB: 1965  AGE: 61 y.o.  male  SOCIAL SECURITY NUMBER: [id number]  Primary Care Provider: Eleazar Matson NP  CODE STATUS: Full Code        ASSESSMENT/PLAN:     1. Acute kidney injury. Likely secondary to dehydration. Baseline creatinine is about 0.6-0.7. Patient received 1 L normal saline bolus in the emergency department. Continue gentle IV fluids with normal saline at 100 cc/h.  2.  Dizziness with visual changes. Feel that this was primarily due to dehydration and near syncope/generalized weakness. Regardless, patient has the same risk factors for stroke as he does for known coronary artery disease. Will request MRI of the brain. Will request echocardiogram.  Code neuro was called after patient presented to the emergency department. Head CT without contrast is negative; CT angiogram of the head and neck does not show any large vessel occlusion. Will request neurology consultation. 3.  Lactic acidosis. Likely secondary to dehydration. Lactic acid levels have improved from 5.4 initially to 2.6, and then 1.2.  4.  Coronary artery disease. S/p stent to the left anterior descending artery in March, 2021. Continue aspirin, Plavix, atorvastatin. Resume Toprol-XL when blood pressure can tolerate. 5.  Recent COVID-19 virus infection. COVID-19 PCR as well as antigen test is negative at this time. We are continuing to monitor inflammatory markers. Feel that infiltrate on chest x-ray is related to COVID-19, not acute bacterial pneumonia.   Procalcitonin levels are negative--patient was given 1 dose of vancomycin and 1 dose of Zosyn in the emergency department, but antibiotics may not be indicated at this time. Echocardiogram requested;  there is concern for interstitial edema, especially as patient gained 10-11 pounds within a matter of couple of weeks recently. 6.  Insulin-dependent diabetes mellitus. Patient is on Trulicity weekly at home. Sliding scale insulin while here. Hold Metformin. 7.  History of tobacco use. Suspect underlying COPD. Patient has significant wheezing on exam.  Brovana 15 mcg nebulizer treatments twice a day, Pulmicort 0.5 mg nebulizer treatments twice a day. 8. Obesity, class II. Current BMI is 39.28. Patient states that he has never had any sleep studies. He is a candidate for obstructive sleep apnea. Nursing staff have noted that he does desaturate to the 80s when he sleeps.         History of Presenting Illness:   Review of old records: The patient was admitted to [facility] for COVID pneumonia on 1/13/2022 and discharged on 1/16/2022. Pauletta Salvia was started on dexamethasone and baricitinib.  His chest x-ray on the 01/13/2022 showed bilateral pulmonary infiltrate concerning for atypical viral pneumonia.  The patient's inflammatory marker  CRP was 1.64 on the day of discharge while it was 7.51 on admission. Patient was saturating 98% on 2 liter/min.  The patient was l hyponatremic at the time of admission.  His hydrochlorothiazide was held. [facility] overall condition is stable.  Respiratory therapist did walk test and he did not require any supplemental oxygen. At time of discharge, patient was advised to continue Bumex 1 mg daily as needed. He was given a prescription for 7 days of dexamethasone 6 mg p.o. daily.     Subjective: Patient is a 61 y.o. male who presents with complaints of dizziness that started on Thursday, 2/24/2022. Patient was hospitalized at [facility] from 1/13/2022 until 1/16/2022 for COVID-19 pneumonia.   He is unvaccinated. He states that the dizziness is getting worse, and now he has been having visual changes where he will see a [place] that starts fading and getting black. Patient did not have any syncope though. He denies any difficulty moving his arms or legs. He denies any diplopia. Patient denies any slurring speech, word finding difficulty, although he does have trouble remembering specific facts about his illness--often stating that we should refer to his wife.     Patient states that he has a persistent cough from the COVID-19 pneumonia, that waxes and wanes. He states that the cough was getting better when he first saw his primary medical doctor following hospitalization at [facility]. He has seen his primary medical doctor 2 more times since discharge on 1/16/2021. He that he went back to see his primary medical doctor (second time since discharge ) about 2 weeks ago after having gained 11 pounds, and getting more short of breath with worsening cough. At that time, his PCP stated that he had \" fluid around his lungs\" and was started on a diuretic. Patient then went back to see his primary medical doctor again on Friday, February 25, 2022. At that time, patient complained of having a fullness and pain in the left lung. He was advised by his primary medical doctor that he may have fibrosis as a result of the COVID-19, and was restarted on steroids. Patient states that his cough is occasionally productive with clear phlegm. Patient states that he did not have a chronic cough prior to getting COVID-19 last month.     Patient denies nausea, vomiting, chest pain, abdominal pain, loss of smell, generalized body aches, headache. He admits to having a lot of fatigue. He states that he had no appetite over the past 1 week, and did not eat much. Patient states that he has been having a little bit of diarrhea \" after the antibiotic was started\".   Patient however was not discharged on any antibiotics on 1/16/2022--not quite sure which antibiotic he is referring to. Patient denies any recent fever, chills. He denies runny nose, sore throat, sinus congestion, postnasal drip. He states that the only remaining symptoms from COVID-19 are the fatigue, shortness of breath, coughing.             Review of Systems:  A comprehensive review of systems was negative except for that written in the History of Present Illness.           Past Medical History:   Diagnosis Date    Coronary artery disease       s/p drug eluting stent to mid-LAD in March, 2021    COVID-19 01/2022     Tested positive on 1/13/2022.  Diabetes [facility]      Hypertension      Prostate cancer ([facility]       s/p brachytherapy         PAST SURGICAL HISTORY:         Past Surgical History:   Procedure Laterality Date    HX CORONARY STENT PLACEMENT   03/09/2021     Successful BONI to mid LAD    HX ROTATOR CUFF REPAIR Right                   Prior to Admission medications    Medication Sig Start Date End Date Taking? Authorizing Provider   dexAMETHasone (DECADRON) 6 mg tablet Take 1 Tablet by mouth Daily (before breakfast). 1/16/22     Leodis Olszewski, MD   clopidogreL (PLAVIX) 75 mg tab Take 75 mg by mouth daily.       Provider, Historical   olmesartan (BENICAR) 40 mg tablet Take 40 mg by mouth daily.       Provider, Historical   bumetanide (BUMEX) 1 mg tablet Take 1 mg by mouth daily as needed.  Indications: visible water retention       Provider, Historical   ascorbic acid, vitamin C, (VITAMIN C) 500 mg tablet Take 500 mg by mouth daily.       Provider, Historical   cholecalciferol, vitamin D3, (Vitamin D3) 50 mcg (2,000 unit) tab Take 2,000 Units by mouth daily.       Provider, Historical   zinc sulfate (ZINC-220 PO) Take 1 Caplet by mouth daily.       Provider, Historical   multivitamin (ONE A DAY) tablet Take 1 Tablet by mouth daily.       Provider, Historical   diclofenac EC (VOLTAREN) 50 mg EC tablet Take 50 mg by mouth four (4) times daily as needed for Pain. 11/12/21     Provider, Historical   Trulicity 0.67 RA/8.0 mL sub-q pen 0.75 mg by SubCUTAneous route every seven (7) days. 11/17/21     Provider, Historical   metFORMIN (GLUCOPHAGE) 500 mg tablet Take 500 mg by mouth two (2) times daily (with meals). 11/14/21     Provider, Historical   cyclobenzaprine (FLEXERIL) 10 mg tablet Take 1 Tablet by mouth nightly as needed for Muscle Spasm(s). 12/1/21     Teetee Kim MD   metoprolol succinate (TOPROL-XL) 100 mg tablet Take 100 mg by mouth daily.       Provider, Historical   aspirin 81 mg chewable tablet Take 81 mg by mouth daily.       Provider, Historical   atorvastatin (Lipitor) 20 mg tablet Take 1 Tab by mouth daily. Indications: treatment to slow progression of coronary artery disease 3/8/21     Peyton Vidal MD              Allergies   Allergen Reactions    Shellfish Derived Nausea Only               Family History   Problem Relation Age of Onset    Diabetes Mother      Coronary Art Dis Mother      Coronary Art Dis Father      Stroke Father           Social History      Tobacco Use    Smoking status: Former Smoker       Packs/day: 1.00       Years: 39.00       Pack years: 39.00       Quit date: 2019       Years since quitting: 3.1    Smokeless tobacco: Never Used   Vaping Use    Vaping Use: Never used   Substance Use Topics    Alcohol use:  Yes       Alcohol/week: 10.0 standard drinks       Types: 10 Cans of beer per week       Comment: 1-2 beers daily    Drug use: Never         Physical exam  Patient Vitals for the past 24 hrs:    BP Temp Pulse Resp SpO2  oxygen therapy   02/27/22 0521 (!) 104/56 -- 93 20 94 %  2 L/min   02/27/22 0401 -- -- 97 -- --     02/27/22 0334 -- -- -- -- 99 %  2 L/min   02/27/22 0330 (!) 116/54 -- 92 14 97 %     02/27/22 0215 (!) 94/46 -- 93 17 95 %     02/27/22 0116 -- -- 99 -- --     02/27/22 0044 -- -- -- -- --     02/27/22 0043 (!) 105/47 -- (!) 103 21 95 %     02/26/22 2354 -- -- -- -- --     02/26/22 2339 (!) 93/52 97.7 °F (36.5 °C) (!) 125 24 97 %        Estimated body mass index is 39.28 kg/m² as calculated from the following:    Height as of this encounter: 6' 1\" (1.854 m). Weight as of this encounter: 135 kg (297 lb 11.2 oz). General: In no acute distress. Well developed, well nourished. Head: Normocephalic, atraumatic. Eyes: Anicteric sclera. PERRL. Extraocular muscles intact. ENT: External ears and nose appear normal.  Oral mucosa moist.  Neck: Supple. No jugular venous distention. Heart: Regular rate and rhythm. No murmurs appreciated. Chest: Symmetrical excursion. Wheezing auscultated about the neck, as well as to lesser extent, throughout the bilateral lung fields, especially in the expiratory phase. Patient also did have some wheezing in the inspiratory phase. He had a prolonged expiratory phase. Abdomen: Soft, nontender. No abnormal distention. Bowel sounds are present throughout. Extremities: No gross deformities. No edema, no cyanosis. Feet are warm to touch. Neurological: No lateralizing deficits. Alert, oriented X3. Skin: No jaundice. No rashes.                     Recent Results (from the past 24 hour(s))   CBC WITH AUTOMATED DIFF     Collection Time: 02/27/22 12:14 AM   Result Value Ref Range     WBC 11.1 4.1 - 11.1 K/uL     RBC 3.80 (L) 4.10 - 5.70 M/uL     HGB 12.7 12.1 - 17.0 g/dL     HCT 36.0 (L) 36.6 - 50.3 %     MCV 94.7 80.0 - 99.0 FL     MCH 33.4 26.0 - 34.0 PG     MCHC 35.3 30.0 - 36.5 g/dL     RDW 13.2 11.5 - 14.5 %     PLATELET 994 758 - 442 K/uL     MPV 9.6 8.9 - 12.9 FL     NRBC 0.0 0  WBC     ABSOLUTE NRBC 0.00 0.00 - 0.01 K/uL     NEUTROPHILS 76 (H) 32 - 75 %     LYMPHOCYTES 19 12 - 49 %     MONOCYTES 4 (L) 5 - 13 %     EOSINOPHILS 0 0 - 7 %     BASOPHILS 0 0 - 1 %     IMMATURE GRANULOCYTES 1 (H) 0.0 - 0.5 %     ABS. NEUTROPHILS 8.4 (H) 1.8 - 8.0 K/UL     ABS. LYMPHOCYTES 2.1 0.8 - 3.5 K/UL     ABS. MONOCYTES 0.5 0.0 - 1.0 K/UL     ABS.  EOSINOPHILS 0.0 0.0 - 0.4 K/UL     ABS. BASOPHILS 0.0 0.0 - 0.1 K/UL     ABS. IMM. GRANS. 0.1 (H) 0.00 - 0.04 K/UL     DF AUTOMATED     METABOLIC PANEL, COMPREHENSIVE     Collection Time: 02/27/22 12:14 AM   Result Value Ref Range     Sodium 129 (L) 136 - 145 mmol/L     Potassium 3.7 3.5 - 5.1 mmol/L     Chloride 92 (L) 97 - 108 mmol/L     CO2 23 21 - 32 mmol/L     Anion gap 14 5 - 15 mmol/L     Glucose 195 (H) 65 - 100 mg/dL     BUN 10 6 - 20 MG/DL     Creatinine 1.43 (H) 0.70 - 1.30 MG/DL     BUN/Creatinine ratio 7 (L) 12 - 20       GFR est AA >60 >60 ml/min/1.73m2     GFR est non-AA 51 (L) >60 ml/min/1.73m2     Calcium 8.9 8.5 - 10.1 MG/DL     Bilirubin, total 1.8 (H) 0.2 - 1.0 MG/DL     ALT (SGPT) 97 (H) 12 - 78 U/L     AST (SGOT) 64 (H) 15 - 37 U/L     Alk.  phosphatase 83 45 - 117 U/L     Protein, total 6.8 6.4 - 8.2 g/dL     Albumin 2.9 (L) 3.5 - 5.0 g/dL     Globulin 3.9 2.0 - 4.0 g/dL     A-G Ratio 0.7 (L) 1.1 - 2.2     PROTHROMBIN TIME + INR     Collection Time: 02/27/22 12:14 AM   Result Value Ref Range     INR 1.2 (H) 0.9 - 1.1       Prothrombin time 11.9 (H) 9.0 - 11.1 sec   LACTIC ACID     Collection Time: 02/27/22 12:14 AM   Result Value Ref Range     Lactic acid 5.4 (HH) 0.4 - 2.0 MMOL/L   TROPONIN-HIGH SENSITIVITY     Collection Time: 02/27/22 12:14 AM   Result Value Ref Range     Troponin-High Sensitivity 7 0 - 76 ng/L   PROCALCITONIN     Collection Time: 02/27/22 12:14 AM   Result Value Ref Range     Procalcitonin 0.21 ng/mL   NT-PRO BNP     Collection Time: 02/27/22 12:14 AM   Result Value Ref Range     NT pro- (H) <125 PG/ML   D DIMER     Collection Time: 02/27/22 12:14 AM   Result Value Ref Range     D-dimer 1.17 (H) 0.00 - 0.65 mg/L FEU   LD     Collection Time: 02/27/22 12:14 AM   Result Value Ref Range      (H) 85 - 241 U/L   SAMPLES BEING HELD     Collection Time: 02/27/22 12:18 AM   Result Value Ref Range     SAMPLES BEING HELD 1RD,PAIR BC       COMMENT           Add-on orders for these samples will be processed based on acceptable specimen integrity and analyte stability, which may vary by analyte. CULTURE, BLOOD     Collection Time: 02/27/22 12:18 AM     Specimen: Blood   Result Value Ref Range     Special Requests: NO SPECIAL REQUESTS       Culture result: NO GROWTH AFTER 3 HOURS     CULTURE, BLOOD     Collection Time: 02/27/22 12:18 AM     Specimen: Blood   Result Value Ref Range     Special Requests: NO SPECIAL REQUESTS       Culture result: NO GROWTH AFTER 3 HOURS     GLUCOSE, POC     Collection Time: 02/27/22 12:21 AM   Result Value Ref Range     Glucose (POC) 198 (H) 65 - 117 mg/dL     Performed by [facility] MAIN)     COVID-19 RAPID TEST     Collection Time: 02/27/22  1:18 AM   Result Value Ref Range     Specimen source Nasopharyngeal       COVID-19 rapid test Not detected NOTD     LACTIC ACID     Collection Time: 02/27/22  2:49 AM   Result Value Ref Range     Lactic acid 2.6 (HH) 0.4 - 2.0 MMOL/L   LACTIC ACID     Collection Time: 02/27/22  6:31 AM   Result Value Ref Range     Lactic acid 1.2 0.4 - 2.0 MMOL/L   GLUCOSE, POC     Collection Time: 02/27/22  7:47 AM   Result Value Ref Range     Glucose (POC) 140 (H) 65 - 117 mg/dL     Performed by Dayron Melara (Tech)           XR CHEST PORT  Narrative: INDICATION: sob     EXAM:  AP CHEST RADIOGRAPH     COMPARISON: January 13, 2022     FINDINGS:     AP portable view of the chest demonstrates a normal cardiomediastinal  silhouette. Interval decrease in diffuse interstitial and airspace opacities in  both lungs. The osseous structures are unremarkable. Impression: Interval decrease in diffuse interstitial and airspace opacities, with mild  residual disease again concerning for viral/atypical pneumonia.     CTA CODE NEURO HEAD AND NECK W CONT  PRELIMINARY REPORT     No perfusion abnormality. No acute large vessel occlusion.  Patchy groundglass  opacities/interlobular septal opacities in the upper lung zones could be edema  and/or viral/atypical pneumonia     Preliminary report was provided by Dr. Scar Sutherland the on-call radiologist, at 4078     Final report to follow.        CT CODE NEURO PERF W CBF  PRELIMINARY REPORT     No perfusion abnormality. No acute large vessel occlusion. Patchy groundglass  opacities/interlobular septal opacities in the upper lung zones could be edema  and/or viral/atypical pneumonia     Preliminary report was provided by Dr. Scar Sutherland the on-call radiologist, at 2090     Final report to follow.        CT CODE NEURO HEAD WO CONTRAST  Narrative: INDICATION: Code Stroke     EXAM:  HEAD CT WITHOUT CONTRAST     COMPARISON: None     TECHNIQUE:  Routine noncontrast axial head CT was performed. Sagittal and  coronal reconstructions were generated.       CT dose reduction was achieved through use of a standardized protocol tailored  for this examination and automatic exposure control for dose modulation.     FINDINGS:     Ventricles: Midline, no hydrocephalus. Intracranial Hemorrhage: None. Brain Parenchyma/Brainstem: Normal for age. Basal Cisterns: Normal.  Paranasal Sinuses: Visualized sinuses are clear. Additional Comments: N/A.   Impression: No acute process.                              Patient Demographics    Patient Name   Ann Randolph   [id number] Legal Sex   Male    1965 Address   [address] [place] [address] Phone   [phone] (Home)   [phone] (Mobile)   CSN:   [id number]     Admit Date: Admit Time Room Bed   2022 11:44 PM [facility] [facility] [[id number]]       Attending Providers    Provider Pager From To   Smooth Malave MD  22   Fede Salazar DO  22   Ilir Barton MD  22     Emergency Contact(s)    Name Relation Home Work [place], [place] Spouse [phone]     Utilization Reviews         Viral Illness, Acute - Care Day 1 (2022) by Adriana Rand Entered Review Status   2022 09:31 Completed      Criteria Review      Care Day: 1 Care Date: 2/27/2022 Level of Care: Telemetry    Guideline Day 1    Clinical Status    (X) * Clinical Indications met    2/28/2022 09:31:28 EST by Shannan Roberts      2/27/2022 02:49  Lactic acid: 2.6 (HH)    Vitals: Temp 98.3, HR 88, R 20, B/P 105/47, 104/56, 142/72 and O2 97% 2L/NC  Hemodynamic instability    Activity    (X) Activity as tolerated    2/28/2022 09:31:28 EST by Shannan Roberts      up ad dru w/assist    Routes    (X) Oral or IV hydration    2/28/2022 09:31:28 EST by Shannan Roberts      regular diet    NS 125ml/hr IV  NS 1,000ml bolus IV x5    (X) Parenteral or oral medications    2/28/2022 09:31:28 EST by Shannan Roberts      Vitamin C 500mg PO QD  ASA 81mg PO QD  Lipitor 20mg PO QD  Olumiant 4mg PO QD  Pulmicort 500mcg Nebulization BID  Vitamin D3 2,000units PO QD  Plavix 75mg O QD  Metoprolol 25mg PO QD  MVI 1 tablet PO QD  Zinc sulfate 1 capsule PO QD  Decadron 6mg PO QD    (X) Liquid or usual diet    2/28/2022 09:31:28 EST by Shannan Roberts      regular diet    Interventions    (X) Possible isolation    2/28/2022 09:31:28 EST by Shannan Roberts      Droplet plus    (X) CBC with differential, chemistries, renal and hepatic function testing, C-reactive protein, coagulation panel    2/28/2022 09:31:28 EST by Shannan Roberts      WBC: 11.1  RBC: 3.80 (L)  HGB: 12.7  HCT: 36.0 (L)  PLATELET: 139  MPV: 9.6  NEUTROPHILS: 76 (H)  LYMPHOCYTES: 19  MONOCYTES: 4 (L)  IMMATURE GRANULOCYTES: 1 (H)  ABS. NEUTROPHILS: 8.4 (H)  ABS. IMM. GRANS.: 0.1 (H)    (X) Possible oxygen    2/28/2022 09:31:28 EST by Shannan Roberts      O2 97% 2L/NC    (X) Possible chest x-ray    2/28/2022 09:31:28 EST by Emmet Armando      CXR - Interval decrease in diffuse interstitial and airspace opacities, with mild  residual disease again concerning for viral/atypical pneumonia.     Medications    (X) Possible antibiotic (eg, for bacterial coinfection or superinfection) 2/28/2022 09:31:28 EST by Marlene Forman 9,681D IV TID  Vancomycin 2,500mg IV x1    (X) Possible DVT prophylaxis    2/28/2022 09:31:28 EST by Marlene Iraheta      Lovenox 30mg SC BID    * Milestone   Additional Notes   DATE: 2/27/2022 Continued Stay Review/Telemetry         Internal medicine progress note:   61 y.o. male who presents with complaints of dizziness that started on Thursday, 2/24/2022. Physical exam:   General: In no acute distress.  Well, developed, well nourished. Head: Normocephalic, atraumatic. Eyes: Anicteric sclera.  PERRL.  Extraocular muscles intact. ENT: External ears and nose appear normal.  Oral mucosa moist.   Neck: Supple.  No jugular venous distention. Heart: Regular rate and rhythm.  No murmurs appreciated. Chest: Symmetrical excursion.  Wheezing auscultated about the neck, as well as to lesser extent, throughout the bilateral lung fields, especially in the expiratory phase.  Patient also did have some wheezing in the inspiratory phase.  He had a prolonged expiratory phase. Abdomen: Soft, nontender.  No abnormal distention. Bowel sounds are present throughout. Extremities: No gross deformities.  No edema, no cyanosis.  Feet are warm to touch. Neurological: No lateralizing deficits.  Alert, oriented X3.    Skin: No jaundice.  No rashes.          ASSESSMENT/PLAN:   1.  Acute kidney injury.  Likely secondary to dehydration.  Baseline creatinine is about 0.6-0.7.  Patient received 1 L normal saline bolus in the emergency department.  Continue gentle IV fluids with normal saline at 100 cc/h.   2.  Dizziness with visual changes.  Feel that this was primarily due to dehydration and near syncope/generalized weakness.  Regardless, patient has the same risk factors for stroke as he does for known coronary artery disease.  Will request MRI of the brain.  Will request echocardiogram.  Code neuro was called after patient presented to the emergency department. [facility] CT without contrast is negative; CT angiogram of the head and neck does not show any large vessel occlusion.  Will request neurology consultation. 3.  Lactic acidosis.  Likely secondary to dehydration.  Lactic acid levels have improved from 5.4 initially to 2.6, and then 1.2.   4.  coronary artery disease.  S/p stent to the left anterior descending artery in March 2021.  Continue aspirin, Plavix, atorvastatin.  Resume Toprol-XL when blood pressure can tolerate. 5.  Recent COVID-19 virus infection.  COVID-19 PCR as well as antigen test is negative at this time.  We are continuing to monitor inflammatory markers.  Feel that infiltrate on chest x-ray is related to COVID-19, not acute bacterial pneumonia.  Procalcitonin levels are negative--patient was given 1 dose of vancomycin and 1 dose of Zosyn in the emergency department, but antibiotics may not be indicated at this time.  Echocardiogram requested; there is concern for interstitial edema, especially as patient gained 10-11 pounds within a matter of couple of weeks recently. 6.  Insulin-dependent diabetes mellitus.  Patient is on Trulicity weekly at home.  Sliding scale insulin while here.  Hold Metformin. 7.  History of tobacco use.  Suspect underlying COPD.  Patient has significant wheezing on exam.  Brovana 15 mcg nebulizer treatments twice a day, Pulmicort 0.5 mg nebulizer treatments twice a day. 8. Obesity, class II.  Current BMI is 39.28.  Patient states that he has never had any sleep studies. Joy Miguel is a candidate for obstructive sleep apnea.  Nursing staff have noted that he does desaturate to the 80s when he sleeps. Neurology progress note:   Assessment/ Plan         ICD-10-CM ICD-9-CM    1. Sepsis, due to unspecified organism, unspecified whether acute organ dysfunction present ([facility] A41.9 038. 9      995.91    2. Acute kidney injury ([facility] N17.9 584.9    3. Lactic acidosis E87.2 276.2    4. Lightheadedness R42 780.4    5.  Hyponatremia E87.1 276.1    6. HCAP (healthcare-associated pneumonia) J18.9 486            Dizziness and visual disturbance since mid-last week and reportedly low BP measurements at home.  D/w patient that given that Brain MRI is normal, the dizziness and tunnel-like vision are due to the low blood pressure.  He expressed understanding.       No additional neurology testing is needed   Please call Neurology back if any further questions/ concerns              Relevant baselines: (lab values, vitals, o2 amount/delivery, etc.)   Brain MRI - No acute findings. Neuro CT:   1.  No large vessel occlusion, perfusion abnormality, or hemodynamically   significant carotid stenosis. 2.  Ground glass opacities and interlobular septal thickening in the upper lobes   of the lungs bilaterally could be reflective of edema or atypical viral   infection. Head/Neck CTA:   1.  No large vessel occlusion, perfusion abnormality, or hemodynamically   significant carotid stenosis. 2.  Ground glass opacities and interlobular septal thickening in the upper lobes   of the lungs bilaterally could be reflective of edema or atypical viral   infection. 2/27/2022 00:14   WBC: 11.1   RBC: 3.80 (L)   HGB: 12.7   HCT: 36.0 (L)   PLATELET: 763   MPV: 9.6   NEUTROPHILS: 76 (H)   LYMPHOCYTES: 19   MONOCYTES: 4 (L)   IMMATURE GRANULOCYTES: 1 (H)   ABS. NEUTROPHILS: 8.4 (H)   ABS. IMM.  GRANS.: 0.1 (H)   INR: 1.2 (H)   Prothrombin time: 11.9 (H)   D-dimer: 1.17 (H)   Sodium: 129 (L)   Potassium: 3.7   Chloride: 92 (L)   Glucose: 195 (H)   Creatinine: 1.43 (H)   BUN/Creatinine ratio: 7 (L)   GFR est non-AA: 51 (L)   Bilirubin, total: 1.8 (H)   Albumin: 2.9 (L)   Globulin: 3.9   A-G Ratio: 0.7 (L)   ALT: 97 (H)   AST: 64 (H)   LD: 320 (H)   NT pro-BNP: 201 (H)   Ferritin: 1,889 (H)      2/27/2022 00:18   CULTURE, BLOOD: NO GROWTH 1 DAY P         2/27/2022 02:49   Lactic acid: 2.6 (HH)         2/27/2022 06:31   Lactic acid: 1.2      2/27/2022 16:21   GLUCOSE, FAST - POC: 251 (H)         Vitals: Temp 98.3, HR 88, R 20, B/P 105/47, 104/56, 142/72 and O2 97% 2L/NC         Medications:   NS 125ml/hr IV   Brovana 15mcg Nebulization BID   Vitamin C 500mg PO QD   ASA 81mg PO QD   Lipitor 20mg PO QD   Olumiant 4mg PO QD   Pulmicort 500mcg Nebulization BID   Vitamin D3 2,000units PO QD   Plavix 75mg O QD   Lovenox 30mg SC BID   Humalog SSI SC QID   Metoprolol 25mg PO QD   MVI 1 tablet PO QD   Zinc sulfate 1 capsule PO QD   Zosyn 3,375g IV TID   Vancomycin 2,500mg IV x1   Decadron 6mg PO QD           Viral Illness, Acute - Clinical Indications for Admission to Inpatient Care by Chilo Gustafson       Review Entered Review Status   2/28/2022 09:26 Completed      Criteria Review      Clinical Indications for Admission to Inpatient Care    Most Recent : Chilo Gustafson Most Recent Date: 2/28/2022 09:26:49 EST    (X) Admission is indicated for  1 or more  of the following  [A] [B] (1) (2) (3) (4) (5) (6) (7)    (8) (9):       (X) Systemic [A] manifestation, as indicated by  1 or more  of the following :          (X) Hemodynamic instability          2/28/2022 09:26:49 EST by Chilo Gustafson            Lactic acid: 5.4 (HH)

## 2022-03-01 NOTE — DISCHARGE SUMMARY
.     Discharge Summary       PATIENT ID: Mya Cevallos  MRN: [id number]   YOB: 1965    DATE OF ADMISSION: 2/26/2022 11:44 PM    DATE OF DISCHARGE: 2/28/2022  PRIMARY CARE PROVIDER: Tashi Simmons NP     ATTENDING PHYSICIAN: Vanita Verdugo MD  DISCHARGING PROVIDER: Vanita Verdugo MD    To contact this individual call [phone] and ask the  to page. If unavailable ask to be transferred the Adult Hospitalist Department. CONSULTATIONS: IP CONSULT TO NEUROLOGY    PROCEDURES/SURGERIES: * No surgery found *    [address] COURSE:   \"Patient is a 61 y.o. male who presents with complaints of dizziness that started on Thursday, 2/24/2022. Patient was hospitalized at [address] [place] [address] from 1/13/2022 until 1/16/2022 for COVID-19 pneumonia. He is unvaccinated. He states that the dizziness is getting worse, and now he has been having visual changes where he will see a [place] that starts fading and getting black. Patient did not have any syncope though. He denies any difficulty moving his arms or legs. He denies any diplopia. Patient denies any slurring speech, word finding difficulty, although he does have trouble remembering specific facts about his illness--often stating that we should refer to his wife.     Patient states that he has a persistent cough from the COVID-19 pneumonia, that waxes and wanes. He states that the cough was getting better when he first saw his primary medical doctor following hospitalization at [address] [place] [address]. He has seen his primary medical doctor 2 more times since discharge on 1/16/2021. He that he went back to see his primary medical doctor (second time since discharge ) about 2 weeks ago after having gained 11 pounds, and getting more short of breath with worsening cough. At that time, his PCP stated that he had \" fluid around his lungs\" and was started on a diuretic.   Patient then went back to see his primary medical doctor again on Friday, February 25, 2022. At that time, patient complained of having a fullness and pain in the left lung. He was advised by his primary medical doctor that he may have fibrosis as a result of the COVID-19, and was restarted on steroids. Patient states that his cough is occasionally productive with clear phlegm. Patient states that he did not have a chronic cough prior to getting COVID-19 last month.     Patient denies nausea, vomiting, chest pain, abdominal pain, loss of smell, generalized body aches, headache. He admits to having a lot of fatigue. He states that he had no appetite over the past 1 week, and did not eat much. Patient states that he has been having a little bit of diarrhea \" after the antibiotic was started\". Patient however was not discharged on any antibiotics on 1/16/2022--not quite sure which antibiotic he is referring to. Patient denies any recent fever, chills. He denies runny nose, sore throat, sinus congestion, postnasal drip. He states that the only remaining symptoms from COVID-19 are the fatigue, shortness of breath, coughing. \"    DISCHARGE DIAGNOSES / PLAN:      HARDY  Dizziness  Lactic acidosis  CAD  Recent COVID-19 infection  Tobacco abuse  Diabetes  Obesity    Patient was initially admitted for work-up of possible sepsis, stroke, and HARDY, patient recently was diagnosed with COVID-19, patient has no history of shortness of breath and was told by PCP that he was at home and was started on diuretics, which in turn has resulted in severe dehydration, HARDY, orthostatic hypotension. Patient was admitted to telemetry, started on empiric IV antibiotics and fluid resuscitation, repeat COVID-19 was negative, MRI head and CT head was negative for any acute stroke, HARDY resolved after fluid resuscitation, 2D echo did not show any signs or symptoms of CHF, proBNP was within normal limits and patient appears euvolemic at the time of discharge.     Patient will also be started on any CAD medication advised on tobacco cessation and will also start on his diabetic regimen. Patient euvolemic and hemodynamically stable at the time of discharge, no sign or symptom of sepsis or respiratory distress, patient was saturating WNL on RA. Patient was discharged home to follow-up with PCP and advised to keep hydrated. PENDING TEST RESULTS:   At the time of discharge the following test results are still pendin    FOLLOW UP APPOINTMENTS:    Follow-up Information     Follow up With Specialties Details Why Contact Info    Marie Bishop NP Nurse Practitioner   [address] [facility]  [address]  [address] [facility]  [phone]             ADDITIONAL CARE RECOMMENDATIONS: Outpatient PCP follow-up    DIET: Regular Diet  Oral Nutritional Supplements: Boost Glucose ControlOnce daily    ACTIVITY: Activity as tolerated      DISCHARGE MEDICATIONS:  Discharge Medication List as of 2022  5:49 PM      CONTINUE these medications which have NOT CHANGED    Details   olmesartan (BENICAR) 40 mg tablet Take 40 mg by mouth daily. , Historical Med      ascorbic acid, vitamin C, (VITAMIN C) 500 mg tablet Take 500 mg by mouth daily. , Historical Med      cholecalciferol, vitamin D3, (Vitamin D3) 50 mcg (2,000 unit) tab Take 2,000 Units by mouth daily. , Historical Med      zinc sulfate (ZINC-220 PO) Take 1 Caplet by mouth daily. , Historical Med      multivitamin (ONE A DAY) tablet Take 1 Tablet by mouth daily. , Historical Med      Trulicity 9.65 IR/1.7 mL sub-q pen 0.75 mg by SubCUTAneous route every seven (7) days. , Historical Med, HANNAH      metFORMIN (GLUCOPHAGE) 500 mg tablet Take 500 mg by mouth two (2) times daily (with meals). , Historical Med      metoprolol succinate (TOPROL-XL) 100 mg tablet Take 100 mg by mouth daily. , Historical Med      aspirin 81 mg chewable tablet Take 81 mg by mouth daily. , Historical Med      atorvastatin (Lipitor) 20 mg tablet Take 1 Tab by mouth daily.  Indications: treatment to slow progression of coronary artery disease, Print, Disp-30 Tab, R-5         STOP taking these medications       dexAMETHasone (DECADRON) 6 mg tablet Comments:   Reason for Stopping:         clopidogreL (PLAVIX) 75 mg tab Comments:   Reason for Stopping:         bumetanide (BUMEX) 1 mg tablet Comments:   Reason for Stopping:         diclofenac EC (VOLTAREN) 50 mg EC tablet Comments:   Reason for Stopping:         cyclobenzaprine (FLEXERIL) 10 mg tablet Comments:   Reason for Stopping:                 NOTIFY YOUR PHYSICIAN FOR ANY OF THE FOLLOWING:   Fever over 101 degrees for 24 hours. Chest pain, shortness of breath, fever, chills, nausea, vomiting, diarrhea, change in mentation, falling, weakness, bleeding. Severe pain or pain not relieved by medications. Or, any other signs or symptoms that you may have questions about.     DISPOSITION:    Home With:   OT  PT  HH  RN       Long term SNF/Inpatient Rehab    Independent/assisted living    Hospice    Other:       PATIENT CONDITION AT DISCHARGE:     Functional status    Poor     Deconditioned     Independent      Cognition     Lucid     Forgetful     Dementia      Catheters/lines (plus indication)    [place]     PICC     PEG     None      Code status     Full code     DNR      PHYSICAL EXAMINATION AT DISCHARGE:   Refer to Progress Note while inpatient      CHRONIC MEDICAL DIAGNOSES:  Problem List as of 2/28/2022 Date Reviewed: 12/1/2021          Codes Class Noted - Resolved    Lactic acidosis ICD-10-CM: E87.2  ICD-9-CM: 276.2  2/27/2022 - Present        Acute respiratory failure with hypoxia ([facility] ICD-10-CM: J96.01  ICD-9-CM: 518.81  1/13/2022 - Present        Hyponatremia ICD-10-CM: E87.1  ICD-9-CM: 276.1  1/13/2022 - Present        HTN (hypertension) ICD-10-CM: I10  ICD-9-CM: 401.9  1/13/2022 - Present        DM (diabetes mellitus) ([facility] ICD-10-CM: E11.9  ICD-9-CM: 250.00  1/13/2022 - Present        Pneumonia due to COVID-19 virus ICD-10-CM: U07.1, J12.82  ICD-9-CM: 480.8, 079.89 1/13/2022 - Present        COVID-19 ICD-10-CM: U07.1  ICD-9-CM: 079.89  2/27/2022 - Present              Greater than 35 minutes were spent with the patient on counseling and coordination of care    Signed:   Emmanuel Alegre MD  3/1/2022  3:39 PM  .

## 2022-03-01 NOTE — ADT AUTH CERT NOTES
INPATIENT ADMISSION NOTIF     ADMISSION DATE 2022    UR CONTACT IS ERNST ESCALONA [phone]  PHONE [phone]    PLEASE FAX BACK REF#, STATUS AND CLINICAL NEEDS TO ME @ [phone]- THANK YOU SO MUCH! [address]      FACILITY NPI :[id number]  FACILITY TAX ID : [id number]     [facility] CARE TELE 2  [address]  [phone]            Patient Name :Lorelei Vickers   :  (61 yrs)  MRN : [id number]     Patient [address]br[address]rez Members [47] , [phone]                                                               .         Insurance Plan Payor: Donna Mcdonald / Plan:   Primary Coverage Subscriber ID : [id number]             Current Patient Class : INPATIENT  Admit Date : 2022     REQUESTED LEVEL OF CARE: INPATIENT [101]                                                           Diagnosis : COVID-19                          ICD10 Code : Lactic acidosis [E87.2]  SWQHB-25 [U07.1]     Current Room and Bed 324/01     Admitting and Attending Info:  Admitting Provider : Lashon Johnson MD     NPI: [id number]  Admitting Provider Phone. [phone]  Admitting Provider Address:   [address] [place] [address] [address] [place] [phone]            Patient Demographics    Patient Name   Elmer Barrera Legal Sex   Male    1965 Address   [address] [place] [address] Phone   [phone] (Home)   [phone] [facility]       Hospital Account    Name Acct ID Class Status Primary Coverage   Elmer Minor [id number] INPATIENT Discharged/Not [address] [facility]            Guarantor Account (for Hospital Account [de-identified])    Name Relation to Pt Service Area Active?  Acct Type   Elmer Minor Self [facility] Yes Personal/Family   Address Phone     [facility] [address]   [address] [phone])              Coverage Information (for Hospital Account [de-identified])    F/O Payor/Plan Subscriber  Subscriber Sex Precert #   BLUE CROSS/VA BLUE CROSS OUT OF STATE 63 [facility]    Subscriber Subscriber #   Priscilla Ross [id number]   Grp # Group Name   [facility]   Address Phone   [address]    Policy Number Status Effective Date Benefits Phone   [id number] -  -   Auth/Cert   [id number]            Diagnosis     Codes Comments   Sepsis, due to unspecified organism, unspecified whether acute organ dysfunction present [facility])  ICD-10-CM: A41.9   ICD-9-CM: 038.9, 995.91             Admission Information    Arrival Date/Time: 2022 2333 Admit Date/Time: 2022 2344 IP Adm.  Date/Time: 2022 1613   Admission Type: Emergency Point of Origin: Non-health Care Facility/self Admit Category: Home   Means of Arrival: Car Primary Service: Medicine Secondary Service: N/A   Transfer Source:  Service Area: [facility] Unit: [facility] 3 PRO CARE TELE 2   Admit Provider: Bonnie Sorto MD Attending Provider: Bruce Cooper MD Referring Provider:      Admission Information    Attending Provider Admission Dx Admitted on    Lactic acidosis, COVID-19 22   Service Isolation Code Status   Medicine -- Prior   Allergies Advance Care Planning    Shellfish Derived Jump to the Activity       Admission Information    Unit/Bed: [facility] 3 PROG CARE TELE  Service: Medicine   Admitting provider: Bonnie Sorto MD Phone: [phone]   Attending provider:  Phone:    PCP: Kezia Raymond NP Phone: [phone]   Admission dx:  Patient class: I   Admission type: ER       Patient Demographics    Patient Name   Alli Browning   [id number] Legal Sex   Male    1965 Address   [address] [place] [address] Phone   [phone] (Home)   [phone] (Mobile)     H&P Notes       H&P by Noemi Andrea DO at 22 0252 documented on ED to Hosp-Admission (Discharged) from 2/26/2022 in [facility] 3 [address] [facility] [address]    Author: Josselin Arambula DO Author Type: Physician Filed: 02/27/22 1521   Note Status: Addendum Cosign: Cosign Not Required Date of Service: 02/27/22 0252   : Josselin Arambula DO (Physician)       Prior Versions: 1. Josselin Arambula DO (Physician) at 02/27/22 1018 - Addendum    2. Josselin Arambula DO (Physician) at 02/27/22 1016 - Signed                                                   History and Physical  NAME: Judy Garcia  MRN: [id number]  YOB: 1965  AGE: 61 y.o.  male  SOCIAL SECURITY NUMBER: [id number]  Primary Care Provider: Eleazar Matson NP  CODE STATUS: Full Code        ASSESSMENT/PLAN:     1. Acute kidney injury. Likely secondary to dehydration. Baseline creatinine is about 0.6-0.7. Patient received 1 L normal saline bolus in the emergency department. Continue gentle IV fluids with normal saline at 100 cc/h.  2.  Dizziness with visual changes. Feel that this was primarily due to dehydration and near syncope/generalized weakness. Regardless, patient has the same risk factors for stroke as he does for known coronary artery disease. Will request MRI of the brain. Will request echocardiogram.  Code neuro was called after patient presented to the emergency department. Head CT without contrast is negative; CT angiogram of the head and neck does not show any large vessel occlusion. Will request neurology consultation. 3.  Lactic acidosis. Likely secondary to dehydration. Lactic acid levels have improved from 5.4 initially to 2.6, and then 1.2.  4.  Coronary artery disease. S/p stent to the left anterior descending artery in March, 2021. Continue aspirin, Plavix, atorvastatin. Resume Toprol-XL when blood pressure can tolerate. 5.  Recent COVID-19 virus infection. COVID-19 PCR as well as antigen test is negative at this time. We are continuing to monitor inflammatory markers.   Feel that infiltrate on chest x-ray is related to COVID-19, not acute bacterial pneumonia. Procalcitonin levels are negative--patient was given 1 dose of vancomycin and 1 dose of Zosyn in the emergency department, but antibiotics may not be indicated at this time. Echocardiogram requested;  there is concern for interstitial edema, especially as patient gained 10-11 pounds within a matter of couple of weeks recently. 6.  Insulin-dependent diabetes mellitus. Patient is on Trulicity weekly at home. Sliding scale insulin while here. Hold Metformin. 7.  History of tobacco use. Suspect underlying COPD. Patient has significant wheezing on exam.  Brovana 15 mcg nebulizer treatments twice a day, Pulmicort 0.5 mg nebulizer treatments twice a day. 8. Obesity, class II. Current BMI is 39.28. Patient states that he has never had any sleep studies. He is a candidate for obstructive sleep apnea. Nursing staff have noted that he does desaturate to the 80s when he sleeps.         History of Presenting Illness:   Review of old records: The patient was admitted to [facility] for COVID pneumonia on 1/13/2022 and discharged on 1/16/2022. [facility] was started on dexamethasone and baricitinib.  His chest x-ray on the 01/13/2022 showed bilateral pulmonary infiltrate concerning for atypical viral pneumonia.  The patient's inflammatory marker  CRP was 1.64 on the day of discharge while it was 7.51 on admission. Patient was saturating 98% on 2 liter/min.  The patient was l hyponatremic at the time of admission.  His hydrochlorothiazide was held. [facility] overall condition is stable.  Respiratory therapist did walk test and he did not require any supplemental oxygen. At time of discharge, patient was advised to continue Bumex 1 mg daily as needed. He was given a prescription for 7 days of dexamethasone 6 mg p.o. daily.     Subjective: Patient is a 61 y.o. male who presents with complaints of dizziness that started on Thursday, 2/24/2022.   Patient was hospitalized at [address] [place] [address] from 1/13/2022 until 1/16/2022 for COVID-19 pneumonia. He is unvaccinated. He states that the dizziness is getting worse, and now he has been having visual changes where he will see a Zuni that starts fading and getting black. Patient did not have any syncope though. He denies any difficulty moving his arms or legs. He denies any diplopia. Patient denies any slurring speech, word finding difficulty, although he does have trouble remembering specific facts about his illness--often stating that we should refer to his wife.     Patient states that he has a persistent cough from the COVID-19 pneumonia, that waxes and wanes. He states that the cough was getting better when he first saw his primary medical doctor following hospitalization at [address] [place] [address]. He has seen his primary medical doctor 2 more times since discharge on 1/16/2021. He that he went back to see his primary medical doctor (second time since discharge ) about 2 weeks ago after having gained 11 pounds, and getting more short of breath with worsening cough. At that time, his PCP stated that he had \" fluid around his lungs\" and was started on a diuretic. Patient then went back to see his primary medical doctor again on Friday, February 25, 2022. At that time, patient complained of having a fullness and pain in the left lung. He was advised by his primary medical doctor that he may have fibrosis as a result of the COVID-19, and was restarted on steroids. Patient states that his cough is occasionally productive with clear phlegm. Patient states that he did not have a chronic cough prior to getting COVID-19 last month.     Patient denies nausea, vomiting, chest pain, abdominal pain, loss of smell, generalized body aches, headache. He admits to having a lot of fatigue. He states that he had no appetite over the past 1 week, and did not eat much.   Patient states that he has been having a little bit of diarrhea \" after the antibiotic was started\". Patient however was not discharged on any antibiotics on 1/16/2022--not quite sure which antibiotic he is referring to. Patient denies any recent fever, chills. He denies runny nose, sore throat, sinus congestion, postnasal drip. He states that the only remaining symptoms from COVID-19 are the fatigue, shortness of breath, coughing.             Review of Systems:  A comprehensive review of systems was negative except for that written in the History of Present Illness.           Past Medical History:   Diagnosis Date    Coronary artery disease       s/p drug eluting stent to mid-LAD in March, 2021    COVID-19 01/2022     Tested positive on 1/13/2022.  Diabetes [facility]      Hypertension      Prostate cancer ([facility]       s/p brachytherapy         PAST SURGICAL HISTORY:         Past Surgical History:   Procedure Laterality Date    HX CORONARY STENT PLACEMENT   03/09/2021     Successful BONI to mid LAD    HX ROTATOR CUFF REPAIR Right                   Prior to Admission medications    Medication Sig Start Date End Date Taking? Authorizing Provider   dexAMETHasone (DECADRON) 6 mg tablet Take 1 Tablet by mouth Daily (before breakfast). 1/16/22     Yolande Sicard, MD   clopidogreL (PLAVIX) 75 mg tab Take 75 mg by mouth daily.       Provider, Historical   olmesartan (BENICAR) 40 mg tablet Take 40 mg by mouth daily.       Provider, Historical   bumetanide (BUMEX) 1 mg tablet Take 1 mg by mouth daily as needed.  Indications: visible water retention       Provider, Historical   ascorbic acid, vitamin C, (VITAMIN C) 500 mg tablet Take 500 mg by mouth daily.       Provider, Historical   cholecalciferol, vitamin D3, (Vitamin D3) 50 mcg (2,000 unit) tab Take 2,000 Units by mouth daily.       Provider, Historical   zinc sulfate (ZINC-220 PO) Take 1 Caplet by mouth daily.       Provider, Historical   multivitamin (ONE A DAY) tablet Take 1 Tablet by mouth daily.       Provider, Historical diclofenac EC (VOLTAREN) 50 mg EC tablet Take 50 mg by mouth four (4) times daily as needed for Pain. 11/12/21     Provider, Historical   Trulicity 6.84 SZ/0.5 mL sub-q pen 0.75 mg by SubCUTAneous route every seven (7) days. 11/17/21     Provider, Historical   metFORMIN (GLUCOPHAGE) 500 mg tablet Take 500 mg by mouth two (2) times daily (with meals). 11/14/21     Provider, Historical   cyclobenzaprine (FLEXERIL) 10 mg tablet Take 1 Tablet by mouth nightly as needed for Muscle Spasm(s). 12/1/21     Eneida Adams MD   metoprolol succinate (TOPROL-XL) 100 mg tablet Take 100 mg by mouth daily.       Provider, Historical   aspirin 81 mg chewable tablet Take 81 mg by mouth daily.       Provider, Historical   atorvastatin (Lipitor) 20 mg tablet Take 1 Tab by mouth daily. Indications: treatment to slow progression of coronary artery disease 3/8/21     Kaylie Driver MD              Allergies   Allergen Reactions    Shellfish Derived Nausea Only               Family History   Problem Relation Age of Onset    Diabetes Mother      Coronary Art Dis Mother      Coronary Art Dis Father      Stroke Father           Social History      Tobacco Use    Smoking status: Former Smoker       Packs/day: 1.00       Years: 39.00       Pack years: 39.00       Quit date: 2019       Years since quitting: 3.1    Smokeless tobacco: Never Used   Vaping Use    Vaping Use: Never used   Substance Use Topics    Alcohol use:  Yes       Alcohol/week: 10.0 standard drinks       Types: 10 Cans of beer per week       Comment: 1-2 beers daily    Drug use: Never         Physical exam  Patient Vitals for the past 24 hrs:    BP Temp Pulse Resp SpO2  oxygen therapy   02/27/22 0521 (!) 104/56 -- 93 20 94 %  2 L/min   02/27/22 0401 -- -- 97 -- --     02/27/22 0334 -- -- -- -- 99 %  2 L/min   02/27/22 0330 (!) 116/54 -- 92 14 97 %     02/27/22 0215 (!) 94/46 -- 93 17 95 %     02/27/22 0116 -- -- 99 -- --     02/27/22 0044 -- -- -- -- --     02/27/22 0043 (!) 105/47 -- (!) 103 21 95 %     02/26/22 2354 -- -- -- -- --     02/26/22 2339 (!) 93/52 97.7 °F (36.5 °C) (!) 125 24 97 %        Estimated body mass index is 39.28 kg/m² as calculated from the following:    Height as of this encounter: 6' 1\" (1.854 m). Weight as of this encounter: 135 kg (297 lb 11.2 oz). General: In no acute distress. Well developed, well nourished. Head: Normocephalic, atraumatic. Eyes: Anicteric sclera. PERRL. Extraocular muscles intact. ENT: External ears and nose appear normal.  Oral mucosa moist.  Neck: Supple. No jugular venous distention. Heart: Regular rate and rhythm. No murmurs appreciated. Chest: Symmetrical excursion. Wheezing auscultated about the neck, as well as to lesser extent, throughout the bilateral lung fields, especially in the expiratory phase. Patient also did have some wheezing in the inspiratory phase. He had a prolonged expiratory phase. Abdomen: Soft, nontender. No abnormal distention. Bowel sounds are present throughout. Extremities: No gross deformities. No edema, no cyanosis. Feet are warm to touch. Neurological: No lateralizing deficits. Alert, oriented X3. Skin: No jaundice. No rashes.                     Recent Results (from the past 24 hour(s))   CBC WITH AUTOMATED DIFF     Collection Time: 02/27/22 12:14 AM   Result Value Ref Range     WBC 11.1 4.1 - 11.1 K/uL     RBC 3.80 (L) 4.10 - 5.70 M/uL     HGB 12.7 12.1 - 17.0 g/dL     HCT 36.0 (L) 36.6 - 50.3 %     MCV 94.7 80.0 - 99.0 FL     MCH 33.4 26.0 - 34.0 PG     MCHC 35.3 30.0 - 36.5 g/dL     RDW 13.2 11.5 - 14.5 %     PLATELET 208 297 - 669 K/uL     MPV 9.6 8.9 - 12.9 FL     NRBC 0.0 0  WBC     ABSOLUTE NRBC 0.00 0.00 - 0.01 K/uL     NEUTROPHILS 76 (H) 32 - 75 %     LYMPHOCYTES 19 12 - 49 %     MONOCYTES 4 (L) 5 - 13 %     EOSINOPHILS 0 0 - 7 %     BASOPHILS 0 0 - 1 %     IMMATURE GRANULOCYTES 1 (H) 0.0 - 0.5 %     ABS. NEUTROPHILS 8.4 (H) 1.8 - 8.0 K/UL     ABS. LYMPHOCYTES 2.1 0.8 - 3.5 K/UL     ABS. MONOCYTES 0.5 0.0 - 1.0 K/UL     ABS. EOSINOPHILS 0.0 0.0 - 0.4 K/UL     ABS. BASOPHILS 0.0 0.0 - 0.1 K/UL     ABS. IMM. GRANS. 0.1 (H) 0.00 - 0.04 K/UL     DF AUTOMATED     METABOLIC PANEL, COMPREHENSIVE     Collection Time: 02/27/22 12:14 AM   Result Value Ref Range     Sodium 129 (L) 136 - 145 mmol/L     Potassium 3.7 3.5 - 5.1 mmol/L     Chloride 92 (L) 97 - 108 mmol/L     CO2 23 21 - 32 mmol/L     Anion gap 14 5 - 15 mmol/L     Glucose 195 (H) 65 - 100 mg/dL     BUN 10 6 - 20 MG/DL     Creatinine 1.43 (H) 0.70 - 1.30 MG/DL     BUN/Creatinine ratio 7 (L) 12 - 20       GFR est AA >60 >60 ml/min/1.73m2     GFR est non-AA 51 (L) >60 ml/min/1.73m2     Calcium 8.9 8.5 - 10.1 MG/DL     Bilirubin, total 1.8 (H) 0.2 - 1.0 MG/DL     ALT (SGPT) 97 (H) 12 - 78 U/L     AST (SGOT) 64 (H) 15 - 37 U/L     Alk.  phosphatase 83 45 - 117 U/L     Protein, total 6.8 6.4 - 8.2 g/dL     Albumin 2.9 (L) 3.5 - 5.0 g/dL     Globulin 3.9 2.0 - 4.0 g/dL     A-G Ratio 0.7 (L) 1.1 - 2.2     PROTHROMBIN TIME + INR     Collection Time: 02/27/22 12:14 AM   Result Value Ref Range     INR 1.2 (H) 0.9 - 1.1       Prothrombin time 11.9 (H) 9.0 - 11.1 sec   LACTIC ACID     Collection Time: 02/27/22 12:14 AM   Result Value Ref Range     Lactic acid 5.4 (HH) 0.4 - 2.0 MMOL/L   TROPONIN-HIGH SENSITIVITY     Collection Time: 02/27/22 12:14 AM   Result Value Ref Range     Troponin-High Sensitivity 7 0 - 76 ng/L   PROCALCITONIN     Collection Time: 02/27/22 12:14 AM   Result Value Ref Range     Procalcitonin 0.21 ng/mL   NT-PRO BNP     Collection Time: 02/27/22 12:14 AM   Result Value Ref Range     NT pro- (H) <125 PG/ML   D DIMER     Collection Time: 02/27/22 12:14 AM   Result Value Ref Range     D-dimer 1.17 (H) 0.00 - 0.65 mg/L FEU   LD     Collection Time: 02/27/22 12:14 AM   Result Value Ref Range      (H) 85 - 241 U/L   SAMPLES BEING HELD     Collection Time: 02/27/22 12:18 AM   Result Value Ref Range     SAMPLES BEING HELD 1RD,PAIR BC       COMMENT           Add-on orders for these samples will be processed based on acceptable specimen integrity and analyte stability, which may vary by analyte. CULTURE, BLOOD     Collection Time: 02/27/22 12:18 AM     Specimen: Blood   Result Value Ref Range     Special Requests: NO SPECIAL REQUESTS       Culture result: NO GROWTH AFTER 3 HOURS     CULTURE, BLOOD     Collection Time: 02/27/22 12:18 AM     Specimen: Blood   Result Value Ref Range     Special Requests: NO SPECIAL REQUESTS       Culture result: NO GROWTH AFTER 3 HOURS     GLUCOSE, POC     Collection Time: 02/27/22 12:21 AM   Result Value Ref Range     Glucose (POC) 198 (H) 65 - 117 mg/dL     Performed by Zulema GanRN)     COVID-19 RAPID TEST     Collection Time: 02/27/22  1:18 AM   Result Value Ref Range     Specimen source Nasopharyngeal       COVID-19 rapid test Not detected NOTD     LACTIC ACID     Collection Time: 02/27/22  2:49 AM   Result Value Ref Range     Lactic acid 2.6 (HH) 0.4 - 2.0 MMOL/L   LACTIC ACID     Collection Time: 02/27/22  6:31 AM   Result Value Ref Range     Lactic acid 1.2 0.4 - 2.0 MMOL/L   GLUCOSE, POC     Collection Time: 02/27/22  7:47 AM   Result Value Ref Range     Glucose (POC) 140 (H) 65 - 117 mg/dL     Performed by Gaetano Aguilar (Tech)           XR CHEST PORT  Narrative: INDICATION: sob     EXAM:  AP CHEST RADIOGRAPH     COMPARISON: January 13, 2022     FINDINGS:     AP portable view of the chest demonstrates a normal cardiomediastinal  silhouette. Interval decrease in diffuse interstitial and airspace opacities in  both lungs. The osseous structures are unremarkable. Impression: Interval decrease in diffuse interstitial and airspace opacities, with mild  residual disease again concerning for viral/atypical pneumonia.     CTA CODE NEURO HEAD AND NECK W CONT  PRELIMINARY REPORT     No perfusion abnormality. No acute large vessel occlusion.  Patchy groundglass  opacities/interlobular septal opacities in the upper lung zones could be edema  and/or viral/atypical pneumonia     Preliminary report was provided by Dr. Chevy Pillai the on-call radiologist, at 9852     Final report to follow.        CT CODE NEURO PERF W CBF  PRELIMINARY REPORT     No perfusion abnormality. No acute large vessel occlusion. Patchy groundglass  opacities/interlobular septal opacities in the upper lung zones could be edema  and/or viral/atypical pneumonia     Preliminary report was provided by Dr. Chevy Pillai the on-call radiologist, at 5644     Final report to follow.        CT CODE NEURO HEAD WO CONTRAST  Narrative: INDICATION: Code Stroke     EXAM:  HEAD CT WITHOUT CONTRAST     COMPARISON: None     TECHNIQUE:  Routine noncontrast axial head CT was performed. Sagittal and  coronal reconstructions were generated.       CT dose reduction was achieved through use of a standardized protocol tailored  for this examination and automatic exposure control for dose modulation.     FINDINGS:     Ventricles: Midline, no hydrocephalus. Intracranial Hemorrhage: None. Brain Parenchyma/Brainstem: Normal for age. Basal Cisterns: Normal.  Paranasal Sinuses: Visualized sinuses are clear. Additional Comments: N/A.   Impression: No acute process.                              Patient Demographics    Patient Name   Yamile Meléndez   [id number] Legal Sex   Male    1965 Address   [address] [place] [address] [phone] Phone   [phone] (Home)   [phone] (Mobile)   CSN:   [id number]     Admit Date: Admit Time Room Bed   2022 11:44 PM [address] [facility] [[id number]]       Attending Providers    Provider Pager From To   Goldy Verdin MD  22   Joel Patterson DO  22   Dion Huffman MD  22     Emergency Contact(s)    Name Relation Home Work Krish[place], [place] Spouse [phone]     Utilization Reviews         Viral Illness, Acute - Care Day 1 (2/27/2022) by Yamel Sotelo       Review Entered Review Status   2/28/2022 09:31 Completed      Criteria Review      Care Day: 1 Care Date: 2/27/2022 Level of Care: Telemetry    Guideline Day 1    Clinical Status    (X) * Clinical Indications met    2/28/2022 09:31:28 EST by Yamel Sotelo      2/27/2022 02:49  Lactic acid: 2.6 (HH)    Vitals: Temp 98.3, HR 88, R 20, B/P 105/47, 104/56, 142/72 and O2 97% 2L/NC  Hemodynamic instability    Activity    (X) Activity as tolerated    2/28/2022 09:31:28 EST by Yamel Sotelo      up ad dru w/assist    Routes    (X) Oral or IV hydration    2/28/2022 09:31:28 EST by Yamel Sotelo      regular diet    NS 125ml/hr IV  NS 1,000ml bolus IV x5    (X) Parenteral or oral medications    2/28/2022 09:31:28 EST by Yamel Sotelo      Vitamin C 500mg PO QD  ASA 81mg PO QD  Lipitor 20mg PO QD  Olumiant 4mg PO QD  Pulmicort 500mcg Nebulization BID  Vitamin D3 2,000units PO QD  Plavix 75mg O QD  Metoprolol 25mg PO QD  MVI 1 tablet PO QD  Zinc sulfate 1 capsule PO QD  Decadron 6mg PO QD    (X) Liquid or usual diet    2/28/2022 09:31:28 EST by Yamel Sotelo      regular diet    Interventions    (X) Possible isolation    2/28/2022 09:31:28 EST by Yamel Sotelo      Droplet plus    (X) CBC with differential, chemistries, renal and hepatic function testing, C-reactive protein, coagulation panel    2/28/2022 09:31:28 EST by Yamel Sotelo      WBC: 11.1  RBC: 3.80 (L)  HGB: 12.7  HCT: 36.0 (L)  PLATELET: 558  MPV: 9.6  NEUTROPHILS: 76 (H)  LYMPHOCYTES: 19  MONOCYTES: 4 (L)  IMMATURE GRANULOCYTES: 1 (H)  ABS. NEUTROPHILS: 8.4 (H)  ABS. IMM.  GRANS.: 0.1 (H)    (X) Possible oxygen    2/28/2022 09:31:28 EST by Tally Shine      O2 97% 2L/NC    (X) Possible chest x-ray    2/28/2022 09:31:28 EST by Yamel Sotelo      CXR - Interval decrease in diffuse interstitial and airspace opacities, with mild  residual disease again concerning for viral/atypical pneumonia. Medications    (X) Possible antibiotic (eg, for bacterial coinfection or superinfection)    2/28/2022 09:31:28 EST by Reji Choi      Zosyn 4,073R IV TID  Vancomycin 2,500mg IV x1    (X) Possible DVT prophylaxis    2/28/2022 09:31:28 EST by Reji Choi      Lovenox 30mg SC BID    * Milestone   Additional Notes   DATE: 2/27/2022 Continued Stay Review/Telemetry         Internal medicine progress note:   61 y.o. male who presents with complaints of dizziness that started on Thursday, 2/24/2022. Physical exam:   General: In no acute distress.  Well, developed, well nourished. Head: Normocephalic, atraumatic. Eyes: Anicteric sclera.  PERRL.  Extraocular muscles intact. ENT: External ears and nose appear normal.  Oral mucosa moist.   Neck: Supple.  No jugular venous distention. Heart: Regular rate and rhythm.  No murmurs appreciated. Chest: Symmetrical excursion.  Wheezing auscultated about the neck, as well as to lesser extent, throughout the bilateral lung fields, especially in the expiratory phase.  Patient also did have some wheezing in the inspiratory phase.  He had a prolonged expiratory phase. Abdomen: Soft, nontender.  No abnormal distention. Bowel sounds are present throughout. Extremities: No gross deformities.  No edema, no cyanosis.  Feet are warm to touch. Neurological: No lateralizing deficits.  Alert, oriented X3.    Skin: No jaundice.  No rashes.          ASSESSMENT/PLAN:   1.  Acute kidney injury.  Likely secondary to dehydration.  Baseline creatinine is about 0.6-0.7.  Patient received 1 L normal saline bolus in the emergency department.  Continue gentle IV fluids with normal saline at 100 cc/h.   2.  Dizziness with visual changes.  Feel that this was primarily due to dehydration and near syncope/generalized weakness.  Regardless, patient has the same risk factors for stroke as he does for known coronary artery disease.  Will request MRI of the brain.  Will request echocardiogram.  Code neuro was called after patient presented to the emergency department. [facility] CT without contrast is negative; CT angiogram of the head and neck does not show any large vessel occlusion.  Will request neurology consultation. 3.  Lactic acidosis.  Likely secondary to dehydration.  Lactic acid levels have improved from 5.4 initially to 2.6, and then 1.2.   4.  coronary artery disease.  S/p stent to the left anterior descending artery in March 2021.  Continue aspirin, Plavix, atorvastatin.  Resume Toprol-XL when blood pressure can tolerate. 5.  Recent COVID-19 virus infection.  COVID-19 PCR as well as antigen test is negative at this time.  We are continuing to monitor inflammatory markers.  Feel that infiltrate on chest x-ray is related to COVID-19, not acute bacterial pneumonia.  Procalcitonin levels are negative--patient was given 1 dose of vancomycin and 1 dose of Zosyn in the emergency department, but antibiotics may not be indicated at this time.  Echocardiogram requested; there is concern for interstitial edema, especially as patient gained 10-11 pounds within a matter of couple of weeks recently. 6.  Insulin-dependent diabetes mellitus.  Patient is on Trulicity weekly at home.  Sliding scale insulin while here.  Hold Metformin. 7.  History of tobacco use.  Suspect underlying COPD.  Patient has significant wheezing on exam.  Brovana 15 mcg nebulizer treatments twice a day, Pulmicort 0.5 mg nebulizer treatments twice a day. 8. Obesity, class II.  Current BMI is 39.28.  Patient states that he has never had any sleep studies. Alin Mayorga is a candidate for obstructive sleep apnea.  Nursing staff have noted that he does desaturate to the 80s when he sleeps. Neurology progress note:   Assessment/ Plan         ICD-10-CM ICD-9-CM    1. Sepsis, due to unspecified organism, unspecified whether acute organ dysfunction present ([facility] [address] A41.9 038. 9      995.91    2.  Acute kidney injury ([facility] [address] N17.9 584.9    3. Lactic acidosis E87.2 276.2    4. Lightheadedness R42 780.4    5. Hyponatremia E87.1 276.1    6. HCAP (healthcare-associated pneumonia) J18.9 486            Dizziness and visual disturbance since mid-last week and reportedly low BP measurements at home.  D/w patient that given that Brain MRI is normal, the dizziness and tunnel-like vision are due to the low blood pressure.  He expressed understanding.       No additional neurology testing is needed   Please call Neurology back if any further questions/ concerns              Relevant baselines: (lab values, vitals, o2 amount/delivery, etc.)   Brain MRI - No acute findings. Neuro CT:   1.  No large vessel occlusion, perfusion abnormality, or hemodynamically   significant carotid stenosis. 2.  Ground glass opacities and interlobular septal thickening in the upper lobes   of the lungs bilaterally could be reflective of edema or atypical viral   infection. Head/Neck CTA:   1.  No large vessel occlusion, perfusion abnormality, or hemodynamically   significant carotid stenosis. 2.  Ground glass opacities and interlobular septal thickening in the upper lobes   of the lungs bilaterally could be reflective of edema or atypical viral   infection. 2/27/2022 00:14   WBC: 11.1   RBC: 3.80 (L)   HGB: 12.7   HCT: 36.0 (L)   PLATELET: 019   MPV: 9.6   NEUTROPHILS: 76 (H)   LYMPHOCYTES: 19   MONOCYTES: 4 (L)   IMMATURE GRANULOCYTES: 1 (H)   ABS. NEUTROPHILS: 8.4 (H)   ABS. IMM.  GRANS.: 0.1 (H)   INR: 1.2 (H)   Prothrombin time: 11.9 (H)   D-dimer: 1.17 (H)   Sodium: 129 (L)   Potassium: 3.7   Chloride: 92 (L)   Glucose: 195 (H)   Creatinine: 1.43 (H)   BUN/Creatinine ratio: 7 (L)   GFR est non-AA: 51 (L)   Bilirubin, total: 1.8 (H)   Albumin: 2.9 (L)   Globulin: 3.9   A-G Ratio: 0.7 (L)   ALT: 97 (H)   AST: 64 (H)   LD: 320 (H)   NT pro-BNP: 201 (H)   Ferritin: 1,889 (H)      2/27/2022 00:18   CULTURE, BLOOD: NO GROWTH 1 DAY P 2/27/2022 02:49   Lactic acid: 2.6 (HH)         2/27/2022 06:31   Lactic acid: 1.2      2/27/2022 16:21   GLUCOSE, FAST - POC: 251 (H)         Vitals: Temp 98.3, HR 88, R 20, B/P 105/47, 104/56, 142/72 and O2 97% 2L/NC         Medications:   NS 125ml/hr IV   Brovana 15mcg Nebulization BID   Vitamin C 500mg PO QD   ASA 81mg PO QD   Lipitor 20mg PO QD   Olumiant 4mg PO QD   Pulmicort 500mcg Nebulization BID   Vitamin D3 2,000units PO QD   Plavix 75mg O QD   Lovenox 30mg SC BID   Humalog SSI SC QID   Metoprolol 25mg PO QD   MVI 1 tablet PO QD   Zinc sulfate 1 capsule PO QD   Zosyn 3,375g IV TID   Vancomycin 2,500mg IV x1   Decadron 6mg PO QD           Viral Illness, Acute - Clinical Indications for Admission to Inpatient Care by Bk Goldsmith       Review Entered Review Status   2/28/2022 09:26 Completed      Criteria Review      Clinical Indications for Admission to Inpatient Care    Most Recent : Bk Goldsmith Most Recent Date: 2/28/2022 09:26:49 EST    (X) Admission is indicated for  1 or more  of the following  [A] [B] (1) (2) (3) (4) (5) (6) (7)    (8) (9):       (X) Systemic [A] manifestation, as indicated by  1 or more  of the following :          (X) Hemodynamic instability          2/28/2022 09:26:49 EST by Bk Goldsmith            Lactic acid: 5.4 (HH)

## 2022-03-05 LAB
BACTERIA SPEC CULT: NORMAL
BACTERIA SPEC CULT: NORMAL
SERVICE CMNT-IMP: NORMAL
SERVICE CMNT-IMP: NORMAL

## 2022-03-18 PROBLEM — J96.01 ACUTE RESPIRATORY FAILURE WITH HYPOXIA (HCC): Status: ACTIVE | Noted: 2022-01-13

## 2022-03-18 PROBLEM — E87.1 HYPONATREMIA: Status: ACTIVE | Noted: 2022-01-13

## 2022-03-19 PROBLEM — U07.1 COVID-19: Status: ACTIVE | Noted: 2022-02-27

## 2022-03-19 PROBLEM — E87.20 LACTIC ACIDOSIS: Status: ACTIVE | Noted: 2022-02-27

## 2022-03-20 PROBLEM — U07.1 PNEUMONIA DUE TO COVID-19 VIRUS: Status: ACTIVE | Noted: 2022-01-13

## 2022-03-20 PROBLEM — E11.9 DM (DIABETES MELLITUS) (HCC): Status: ACTIVE | Noted: 2022-01-13

## 2022-03-20 PROBLEM — J12.82 PNEUMONIA DUE TO COVID-19 VIRUS: Status: ACTIVE | Noted: 2022-01-13

## 2022-03-20 PROBLEM — I10 HTN (HYPERTENSION): Status: ACTIVE | Noted: 2022-01-13

## 2023-02-22 ENCOUNTER — OFFICE VISIT (OUTPATIENT)
Dept: ORTHOPEDIC SURGERY | Age: 58
End: 2023-02-22
Payer: COMMERCIAL

## 2023-02-22 VITALS — BODY MASS INDEX: 37.77 KG/M2 | WEIGHT: 285 LBS | HEIGHT: 73 IN

## 2023-02-22 DIAGNOSIS — M25.562 LEFT KNEE PAIN, UNSPECIFIED CHRONICITY: ICD-10-CM

## 2023-02-22 DIAGNOSIS — M17.12 OSTEOARTHRITIS OF LEFT KNEE, UNSPECIFIED OSTEOARTHRITIS TYPE: ICD-10-CM

## 2023-02-22 DIAGNOSIS — M17.11 OSTEOARTHRITIS OF RIGHT KNEE, UNSPECIFIED OSTEOARTHRITIS TYPE: Primary | ICD-10-CM

## 2023-02-22 RX ORDER — ERGOCALCIFEROL 1.25 MG/1
CAPSULE ORAL
COMMUNITY
Start: 2023-01-08

## 2023-02-22 RX ORDER — CLONIDINE HYDROCHLORIDE 0.2 MG/1
0.2 TABLET ORAL 2 TIMES DAILY
COMMUNITY
Start: 2023-01-15

## 2023-02-22 RX ORDER — DICLOFENAC SODIUM 75 MG/1
75 TABLET, DELAYED RELEASE ORAL 2 TIMES DAILY
Qty: 60 TABLET | Refills: 3 | Status: SHIPPED | OUTPATIENT
Start: 2023-02-22

## 2023-02-22 RX ORDER — DULAGLUTIDE 1.5 MG/.5ML
INJECTION, SOLUTION SUBCUTANEOUS
COMMUNITY
Start: 2023-01-23

## 2023-02-22 RX ORDER — BUPIVACAINE HYDROCHLORIDE 7.5 MG/ML
2 INJECTION, SOLUTION EPIDURAL; RETROBULBAR ONCE
Status: COMPLETED | OUTPATIENT
Start: 2023-02-22 | End: 2023-02-22

## 2023-02-22 RX ORDER — METHYLPREDNISOLONE ACETATE 80 MG/ML
80 INJECTION, SUSPENSION INTRA-ARTICULAR; INTRALESIONAL; INTRAMUSCULAR; SOFT TISSUE ONCE
Status: COMPLETED | OUTPATIENT
Start: 2023-02-22 | End: 2023-02-22

## 2023-02-22 RX ORDER — EMPAGLIFLOZIN, METFORMIN HYDROCHLORIDE 25; 1000 MG/1; MG/1
TABLET, EXTENDED RELEASE ORAL
COMMUNITY
Start: 2023-01-25

## 2023-02-22 RX ADMIN — METHYLPREDNISOLONE ACETATE 80 MG: 80 INJECTION, SUSPENSION INTRA-ARTICULAR; INTRALESIONAL; INTRAMUSCULAR; SOFT TISSUE at 15:00

## 2023-02-22 RX ADMIN — BUPIVACAINE HYDROCHLORIDE 15 MG: 7.5 INJECTION, SOLUTION EPIDURAL; RETROBULBAR at 15:00

## 2023-02-22 NOTE — PROGRESS NOTES
ASSESSMENT/PLAN:  Below is the assessment and plan developed based on review of pertinent history, physical exam, labs, studies, and medications. 1.  Left knee osteoarthritis  - Cortisone injection  -Diclofenac 75 mg as needed    In discussion with the patient, we considered the numerus possible diagnoses that could be contributing to their present symptoms. We also deliberated on the extensive management options that must be considered to treat their current condition. We reviewed their accessible prior medical records, diagnostic tests, and current health and employment information. We considered how these symptoms were affecting the patient´s activities of daily living as well as employment and fitness activities. The patient had various questions regarding the possible risks, benefits, complications, morbidity and mortality regarding their diagnosis and treatment options. The patients´ comorbidities were considered, and I advocated that they consider maximizing lifestyle modification through nutrition and exercise to aid in addressing their symptoms. Shared decision making yielded an understanding to move forward with conservation treatment preferences. The patient expressed understanding that if conservative management fails to alleviate the present symptoms they will return to office for re-evaluation and consideration of additional diagnostic tests and potential surgical options. In the interim, we have recommned ice, elevation and anti-inflammatory medications along with a physician directed home exercise program. We discussed the risks and common side effects of anti-inflamatory medications and instructed the patient to discontinue the medication and contact us if they experienced any side effects. The patient was encouraged to discuss the possible side effects with their family physician or pharmacist prior to initiating any new medications.      I have encouraged the patient to take an active role in their treatment by pursuing a healthy lifestyle with better nutritional choices and regular low impact exercise. We discussed that weight loss can be beneficial to relieving discomfort in the lower extremities. We discussed the possibility of an injection of a steroid mixed with a local anesthetic to relieve pain and decrease inflammation in the left knee. The risks of a steroid injection which include but are not limited to cartilage damage, death of nearby bone, joint infection, nerve damage, temporary facial flushing, temporary steroid flare of pain and inflammation in the joint, temporary increase in blood sugar, tendon weakening or rupture, thinning of nearby bone (osteoporosis), thinning of skin and soft tissue around the injection site, and whitening or lightening of the skin around the injection site were reviewed at length. We specifically advised that patients with diabetes talk to their primary care to ensure they are safe for a steroid injection due to the transient increase in blood sugar associated with the injection. We discussed the chance of increased bleeding and bruising if the patient is on blood thinners or certain dietary supplements that have a blood-thinning effect. We advised patients that have an active infection, history of allergic reactions to steroids or take medications that may prohibit them from receiving a steroid injection to talk to their primary care physician before receiving and injection. We also talked about limiting the number of injections because these potential side effects increase with larger doses and repeated use. After confirming the proposed area for injection with the patient, the skin was prepped with alcohol to reduce the chances of infection. The skin was anesthetized with topical ethylene chloride spray and the mixture of steroid and local anesthetic was injected slowly into the left knee in a sterile fashion without difficulty.  The needle was removed and disposed of in a sterile container. The patient tolerated the injection well and a band-aid was placed on the skin. The patient was counseled on protecting the injection area, avoiding water submersion for 2 days, icing for pain relief and looking for signs and symptoms of infection. We requested that the patient contact us if any symptoms persist greater than 48 hours after the injection. After a long discussion regarding treatment options, we have elected to refer the patient to one of our knee replacement specialist for more comprehensive care of their arthritis. We discussed with the patient that he has significant end-stage left knee osteoarthritis. He may be looking at a total knee replacement in the future but he would not like to consider this option at this time. He would like to continue with cortisone injections. We will also prescribe him diclofenac to take as needed for his pain. He will follow-up as needed for repeat injections. SUBJECTIVE/OBJECTIVE:  Rosalia Bang (: 1965) is a 61 y.o. male, patient,here for evaluation of the left knee. We have seen him in the past for his osteoarthritis and given him an injection. His pain has returned and he would like another injection today. He denies any major changes since his last visit. The pain mostly hurts him after a long day work on his feet. Physical Exam    Upon physical examination, the patient is well developed, well nourished, alert and oriented times three, with normal mood and affect and walks with an antalgic gait. Upon examination of the left knee, the patient is tender to palpation along the medial joint line, and has an effusion. They have crepitus of the patellofemoral joint with range of motion and discomfort with patella grind testing. The patient has discomfort with Tito´s maneuvers, and the knee is stable. They lack full flexion secondary to the effusion, but have full extension.  They have 5/5 strength, and are neurovascularly intact distally. There is no erythema, warmth or skin lesions present. On examination of the contralateral extremity, the patient is nontender to palpation and has excellent range of motion, stability and strength. Imaging:    I have reviewed the patient´s previous diagnostic tests in an effort to support the diagnosis and treatment options. Allergies   Allergen Reactions    Shellfish Derived Nausea Only       Current Outpatient Medications   Medication Sig    cloNIDine HCL (CATAPRES) 0.2 mg tablet Take 0.2 mg by mouth two (2) times a day. Synjardy XR 25-1,000 mg TBph TAKE ONE TABLET BY MOUTH DAILY FOR CONTROL OF DIABETES    ergocalciferol (ERGOCALCIFEROL) 1,250 mcg (50,000 unit) capsule TAKE 1 CAPSULE WEEKLY WITH FOOD FOR LOW VITAMIN D    Trulicity 1.5 QV/9.3 mL sub-q pen INJECT 1.5 MG SUBCUTANEOUSLY WEEKLY    diclofenac EC (VOLTAREN) 75 mg EC tablet Take 1 Tablet by mouth two (2) times a day. olmesartan (BENICAR) 40 mg tablet Take 40 mg by mouth daily. ascorbic acid, vitamin C, (VITAMIN C) 500 mg tablet Take 500 mg by mouth daily. cholecalciferol, vitamin D3, 50 mcg (2,000 unit) tab Take 2,000 Units by mouth daily. zinc sulfate (ZINC-220 PO) Take 1 Caplet by mouth daily. metoprolol succinate (TOPROL-XL) 100 mg tablet Take 100 mg by mouth daily. aspirin 81 mg chewable tablet Take 81 mg by mouth daily. No current facility-administered medications for this visit. Past Medical History:   Diagnosis Date    Arthritis     Coronary artery disease     s/p drug eluting stent to mid-LAD in March, 2021    COVID-19 01/2022    Tested positive on 1/13/2022.     Diabetes ([facility] [address]     Hypertension     Prostate cancer [facility])     s/p brachytherapy       Past Surgical History:   Procedure Laterality Date    HX CORONARY STENT PLACEMENT  03/09/2021    Successful BONI to mid LAD    HX ROTATOR CUFF REPAIR Right     HX WRIST FRACTURE TX      [place] ANES NRV [facility] TNDN FSCIA BURSA SHOULDER & AXILLA      [place] CERVICOPLASTY         Family History   Problem Relation Age of Onset    Diabetes Mother     Coronary Art Dis Mother     Coronary Art Dis Father     Stroke Father        Social History     Socioeconomic History    Marital status:      Spouse name: Not on file    Number of children: Not on file    Years of education: Not on file    Highest education level: Not on file   Occupational History    Not on file   Tobacco Use    Smoking status: Former     Packs/day: 1.00     Years: 39.00     Pack years: 39.00     Types: Cigarettes     Quit date:      Years since quittin.1    Smokeless tobacco: Never   Vaping Use    Vaping Use: Never used   Substance and Sexual Activity    Alcohol use: Yes     Alcohol/week: 10.0 standard drinks     Types: 10 Cans of beer per week     Comment: 1-2 beers daily    Drug use: Never    Sexual activity: Not on file   Other Topics Concern     Service Not Asked    Blood Transfusions Not Asked    Caffeine Concern Not Asked    Occupational Exposure Not Asked    Hobby Hazards Not Asked    Sleep Concern Not Asked    Stress Concern Not Asked    Weight Concern Not Asked    Special Diet Not Asked    Back Care Not Asked    Exercise Not Asked    Bike Helmet Not Asked    Seat Belt Not Asked    Self-Exams Not Asked   Social History Narrative    Not on file     Social Determinants of Health     Financial Resource Strain: Not on file   Food Insecurity: Not on file   Transportation Needs: Not on file   Physical Activity: Not on file   Stress: Not on file   Social Connections: Not on file   Intimate Partner Violence: Not on file   Housing Stability: Not on file       Review of Systems    No flowsheet data found. Vitals:  Ht 6' 1\" (1.854 m)   Wt 285 lb (129.3 kg)   BMI 37.60 kg/m²    Body mass index is 37.6 kg/m². An electronic signature was used to authenticate this note.   -- Juan Francisco Pedraza MD

## 2023-05-19 RX ORDER — ASCORBIC ACID 500 MG
500 TABLET ORAL DAILY
COMMUNITY

## 2023-05-19 RX ORDER — DICLOFENAC SODIUM 75 MG/1
75 TABLET, DELAYED RELEASE ORAL 2 TIMES DAILY
COMMUNITY
Start: 2023-02-22

## 2023-05-19 RX ORDER — ASPIRIN 81 MG/1
81 TABLET, CHEWABLE ORAL DAILY
COMMUNITY

## 2023-05-19 RX ORDER — ERGOCALCIFEROL 1.25 MG/1
CAPSULE ORAL
COMMUNITY
Start: 2023-01-08

## 2023-05-19 RX ORDER — OLMESARTAN MEDOXOMIL 40 MG/1
40 TABLET ORAL DAILY
COMMUNITY

## 2023-05-19 RX ORDER — CLONIDINE HYDROCHLORIDE 0.2 MG/1
0.2 TABLET ORAL 2 TIMES DAILY
COMMUNITY
Start: 2023-01-15

## 2023-05-19 RX ORDER — DULAGLUTIDE 1.5 MG/.5ML
INJECTION, SOLUTION SUBCUTANEOUS
COMMUNITY
Start: 2023-01-23

## 2023-05-19 RX ORDER — METOPROLOL SUCCINATE 100 MG/1
100 TABLET, EXTENDED RELEASE ORAL DAILY
COMMUNITY

## 2023-05-19 RX ORDER — EMPAGLIFLOZIN, METFORMIN HYDROCHLORIDE 25; 1000 MG/1; MG/1
TABLET, EXTENDED RELEASE ORAL
COMMUNITY
Start: 2023-01-25

## 2025-02-10 ENCOUNTER — CLINICAL DOCUMENTATION (OUTPATIENT)
Age: 60
End: 2025-02-10

## 2025-02-10 NOTE — PROGRESS NOTES
2/10/25 1434: Records received from Anderson Burroughs MD at [facility]. Dx: Cirrhosis and elevated T bili. Needs appt within 3 mos

## 2025-05-16 ENCOUNTER — TELEPHONE (OUTPATIENT)
Age: 60
End: 2025-05-16

## 2025-05-16 NOTE — TELEPHONE ENCOUNTER
Patient's wife called to notify us that her  passed away on May 8 at home. She stated, \"he had cancer and the chemo killed his liver.\" If needed, she can be contacted at [phone].

## (undated) DEVICE — TUBING PRSS MON L6IN PVC M FEM CONN

## (undated) DEVICE — DEVICE INFL 20ML 30ATM DGT FLD DISPNS SYR W ACCESSPLUS BLU

## (undated) DEVICE — CATH BLLN ANGIO 2.75X12MM NC EUPHORIA RX

## (undated) DEVICE — NEEDLE ANGIO 18GA L9CM NRML 1 WALL SMOOTH FINISH CLR HUB FOR

## (undated) DEVICE — PACK PROCEDURE SURG HRT CATH

## (undated) DEVICE — CATH DIAG-D 6F MULTI PIG 155 5 -- IMPULSE 16599-302

## (undated) DEVICE — CATH GUID COR JL4.0 6FR 100CM -- LAUNCHER

## (undated) DEVICE — ANGIO-SEAL VIP VASCULAR CLOSURE DEVICE: Brand: ANGIO-SEAL

## (undated) DEVICE — PINNACLE INTRODUCER SHEATH: Brand: PINNACLE

## (undated) DEVICE — OMNI WIRE

## (undated) DEVICE — PROCEDURE KIT FLUID MGMT CUST MAINFOLD STRL